# Patient Record
Sex: FEMALE | Race: WHITE | NOT HISPANIC OR LATINO | Employment: OTHER | URBAN - METROPOLITAN AREA
[De-identification: names, ages, dates, MRNs, and addresses within clinical notes are randomized per-mention and may not be internally consistent; named-entity substitution may affect disease eponyms.]

---

## 2017-08-23 ENCOUNTER — HOSPITAL ENCOUNTER (OUTPATIENT)
Dept: RADIOLOGY | Facility: MEDICAL CENTER | Age: 57
End: 2017-08-23

## 2017-08-26 ENCOUNTER — HOSPITAL ENCOUNTER (OUTPATIENT)
Dept: RADIOLOGY | Facility: MEDICAL CENTER | Age: 57
End: 2017-08-26
Attending: FAMILY MEDICINE
Payer: COMMERCIAL

## 2017-08-26 DIAGNOSIS — Z12.31 VISIT FOR SCREENING MAMMOGRAM: ICD-10-CM

## 2017-08-26 PROCEDURE — G0202 SCR MAMMO BI INCL CAD: HCPCS

## 2019-04-26 ENCOUNTER — APPOINTMENT (OUTPATIENT)
Dept: ADMISSIONS | Facility: MEDICAL CENTER | Age: 59
End: 2019-04-26
Attending: SURGERY
Payer: COMMERCIAL

## 2019-04-26 RX ORDER — ATORVASTATIN CALCIUM 40 MG/1
40 TABLET, FILM COATED ORAL DAILY
COMMUNITY

## 2019-04-26 RX ORDER — ERGOCALCIFEROL 1.25 MG/1
50000 CAPSULE ORAL
COMMUNITY

## 2019-04-26 RX ORDER — OMEPRAZOLE 20 MG/1
20 CAPSULE, DELAYED RELEASE ORAL DAILY
COMMUNITY

## 2019-04-26 RX ORDER — CITALOPRAM 20 MG/1
20 TABLET ORAL DAILY
COMMUNITY

## 2019-05-01 ENCOUNTER — HOSPITAL ENCOUNTER (OUTPATIENT)
Facility: MEDICAL CENTER | Age: 59
End: 2019-05-01
Attending: SURGERY | Admitting: SURGERY
Payer: COMMERCIAL

## 2019-05-01 ENCOUNTER — ANESTHESIA (OUTPATIENT)
Dept: SURGERY | Facility: MEDICAL CENTER | Age: 59
End: 2019-05-01
Payer: COMMERCIAL

## 2019-05-01 ENCOUNTER — ANESTHESIA EVENT (OUTPATIENT)
Dept: SURGERY | Facility: MEDICAL CENTER | Age: 59
End: 2019-05-01
Payer: COMMERCIAL

## 2019-05-01 VITALS
HEIGHT: 62 IN | TEMPERATURE: 97.9 F | WEIGHT: 171.52 LBS | BODY MASS INDEX: 31.56 KG/M2 | OXYGEN SATURATION: 91 % | HEART RATE: 80 BPM | RESPIRATION RATE: 18 BRPM | SYSTOLIC BLOOD PRESSURE: 116 MMHG | DIASTOLIC BLOOD PRESSURE: 51 MMHG

## 2019-05-01 DIAGNOSIS — G56.01 RIGHT CARPAL TUNNEL SYNDROME: Primary | ICD-10-CM

## 2019-05-01 PROCEDURE — 160002 HCHG RECOVERY MINUTES (STAT): Performed by: SURGERY

## 2019-05-01 PROCEDURE — 160046 HCHG PACU - 1ST 60 MINS PHASE II: Performed by: SURGERY

## 2019-05-01 PROCEDURE — 160036 HCHG PACU - EA ADDL 30 MINS PHASE I: Performed by: SURGERY

## 2019-05-01 PROCEDURE — 700105 HCHG RX REV CODE 258: Performed by: ANESTHESIOLOGY

## 2019-05-01 PROCEDURE — 160035 HCHG PACU - 1ST 60 MINS PHASE I: Performed by: SURGERY

## 2019-05-01 PROCEDURE — 700101 HCHG RX REV CODE 250: Performed by: ANESTHESIOLOGY

## 2019-05-01 PROCEDURE — 500431 HCHG DRESSING, KLING 2: Performed by: SURGERY

## 2019-05-01 PROCEDURE — 160048 HCHG OR STATISTICAL LEVEL 1-5: Performed by: SURGERY

## 2019-05-01 PROCEDURE — 160025 RECOVERY II MINUTES (STATS): Performed by: SURGERY

## 2019-05-01 PROCEDURE — A9270 NON-COVERED ITEM OR SERVICE: HCPCS | Performed by: ANESTHESIOLOGY

## 2019-05-01 PROCEDURE — 160009 HCHG ANES TIME/MIN: Performed by: SURGERY

## 2019-05-01 PROCEDURE — 700111 HCHG RX REV CODE 636 W/ 250 OVERRIDE (IP): Performed by: ANESTHESIOLOGY

## 2019-05-01 PROCEDURE — 700101 HCHG RX REV CODE 250

## 2019-05-01 PROCEDURE — 160029 HCHG SURGERY MINUTES - 1ST 30 MINS LEVEL 4: Performed by: SURGERY

## 2019-05-01 PROCEDURE — 700102 HCHG RX REV CODE 250 W/ 637 OVERRIDE(OP): Performed by: ANESTHESIOLOGY

## 2019-05-01 PROCEDURE — 500881 HCHG PACK, EXTREMITY: Performed by: SURGERY

## 2019-05-01 PROCEDURE — 501838 HCHG SUTURE GENERAL: Performed by: SURGERY

## 2019-05-01 RX ORDER — BUPIVACAINE HYDROCHLORIDE AND EPINEPHRINE 5; 5 MG/ML; UG/ML
INJECTION, SOLUTION EPIDURAL; INTRACAUDAL; PERINEURAL
Status: DISCONTINUED | OUTPATIENT
Start: 2019-05-01 | End: 2019-05-01 | Stop reason: HOSPADM

## 2019-05-01 RX ORDER — DEXAMETHASONE SODIUM PHOSPHATE 4 MG/ML
INJECTION, SOLUTION INTRA-ARTICULAR; INTRALESIONAL; INTRAMUSCULAR; INTRAVENOUS; SOFT TISSUE PRN
Status: DISCONTINUED | OUTPATIENT
Start: 2019-05-01 | End: 2019-05-01 | Stop reason: SURG

## 2019-05-01 RX ORDER — SODIUM CHLORIDE, SODIUM LACTATE, POTASSIUM CHLORIDE, CALCIUM CHLORIDE 600; 310; 30; 20 MG/100ML; MG/100ML; MG/100ML; MG/100ML
INJECTION, SOLUTION INTRAVENOUS CONTINUOUS
Status: DISCONTINUED | OUTPATIENT
Start: 2019-05-01 | End: 2019-05-01 | Stop reason: HOSPADM

## 2019-05-01 RX ORDER — OXYCODONE HCL 5 MG/5 ML
5 SOLUTION, ORAL ORAL
Status: DISCONTINUED | OUTPATIENT
Start: 2019-05-01 | End: 2019-05-01 | Stop reason: HOSPADM

## 2019-05-01 RX ORDER — HALOPERIDOL 5 MG/ML
1 INJECTION INTRAMUSCULAR
Status: DISCONTINUED | OUTPATIENT
Start: 2019-05-01 | End: 2019-05-01 | Stop reason: HOSPADM

## 2019-05-01 RX ORDER — OXYCODONE HCL 5 MG/5 ML
10 SOLUTION, ORAL ORAL
Status: DISCONTINUED | OUTPATIENT
Start: 2019-05-01 | End: 2019-05-01 | Stop reason: HOSPADM

## 2019-05-01 RX ORDER — CEFAZOLIN SODIUM 1 G/3ML
INJECTION, POWDER, FOR SOLUTION INTRAMUSCULAR; INTRAVENOUS PRN
Status: DISCONTINUED | OUTPATIENT
Start: 2019-05-01 | End: 2019-05-01 | Stop reason: SURG

## 2019-05-01 RX ORDER — CELECOXIB 200 MG/1
200 CAPSULE ORAL ONCE
Status: COMPLETED | OUTPATIENT
Start: 2019-05-01 | End: 2019-05-01

## 2019-05-01 RX ORDER — ACETAMINOPHEN 500 MG
1000 TABLET ORAL ONCE
Status: COMPLETED | OUTPATIENT
Start: 2019-05-01 | End: 2019-05-01

## 2019-05-01 RX ORDER — ONDANSETRON 2 MG/ML
4 INJECTION INTRAMUSCULAR; INTRAVENOUS
Status: DISCONTINUED | OUTPATIENT
Start: 2019-05-01 | End: 2019-05-01 | Stop reason: HOSPADM

## 2019-05-01 RX ORDER — ONDANSETRON 2 MG/ML
INJECTION INTRAMUSCULAR; INTRAVENOUS PRN
Status: DISCONTINUED | OUTPATIENT
Start: 2019-05-01 | End: 2019-05-01 | Stop reason: SURG

## 2019-05-01 RX ORDER — MIDAZOLAM HYDROCHLORIDE 1 MG/ML
INJECTION INTRAMUSCULAR; INTRAVENOUS
Status: COMPLETED
Start: 2019-05-01 | End: 2019-05-01

## 2019-05-01 RX ADMIN — LIDOCAINE HYDROCHLORIDE 20 MG: 20 INJECTION, SOLUTION INFILTRATION; PERINEURAL at 12:32

## 2019-05-01 RX ADMIN — PROPOFOL 300 MCG/KG/MIN: 10 INJECTION, EMULSION INTRAVENOUS at 12:32

## 2019-05-01 RX ADMIN — FENTANYL CITRATE 50 MCG: 50 INJECTION, SOLUTION INTRAMUSCULAR; INTRAVENOUS at 12:32

## 2019-05-01 RX ADMIN — ONDANSETRON 4 MG: 2 INJECTION INTRAMUSCULAR; INTRAVENOUS at 12:45

## 2019-05-01 RX ADMIN — SODIUM CHLORIDE, POTASSIUM CHLORIDE, SODIUM LACTATE AND CALCIUM CHLORIDE: 600; 310; 30; 20 INJECTION, SOLUTION INTRAVENOUS at 11:33

## 2019-05-01 RX ADMIN — FENTANYL CITRATE 50 MCG: 50 INJECTION, SOLUTION INTRAMUSCULAR; INTRAVENOUS at 12:35

## 2019-05-01 RX ADMIN — MIDAZOLAM HYDROCHLORIDE 1 MG: 1 INJECTION, SOLUTION INTRAMUSCULAR; INTRAVENOUS at 12:22

## 2019-05-01 RX ADMIN — CEFAZOLIN 2 G: 330 INJECTION, POWDER, FOR SOLUTION INTRAMUSCULAR; INTRAVENOUS at 12:32

## 2019-05-01 RX ADMIN — DEXAMETHASONE SODIUM PHOSPHATE 8 MG: 4 INJECTION, SOLUTION INTRA-ARTICULAR; INTRALESIONAL; INTRAMUSCULAR; INTRAVENOUS; SOFT TISSUE at 12:38

## 2019-05-01 RX ADMIN — PROPOFOL 60 MG: 10 INJECTION, EMULSION INTRAVENOUS at 12:32

## 2019-05-01 RX ADMIN — SODIUM CHLORIDE, POTASSIUM CHLORIDE, SODIUM LACTATE AND CALCIUM CHLORIDE: 600; 310; 30; 20 INJECTION, SOLUTION INTRAVENOUS at 12:25

## 2019-05-01 RX ADMIN — PROPOFOL 50 MG: 10 INJECTION, EMULSION INTRAVENOUS at 12:37

## 2019-05-01 RX ADMIN — CELECOXIB 200 MG: 200 CAPSULE ORAL at 11:37

## 2019-05-01 RX ADMIN — ACETAMINOPHEN 1000 MG: 500 TABLET ORAL at 11:36

## 2019-05-01 ASSESSMENT — PAIN SCALES - GENERAL: PAIN_LEVEL: 0

## 2019-05-01 NOTE — ANESTHESIA POSTPROCEDURE EVALUATION
Patient: Rachel Reich    Procedure Summary     Date:  05/01/19 Room / Location:  [facility] ROOM 21 / SURGERY SAME DAY [facility]    Anesthesia Start:  1225 Anesthesia Stop:  1258    Procedure:  RELEASE, CARPAL TUNNEL, ENDOSCOPIC (Right Hand) Diagnosis:  (HAND PAIN, CARPAL TUNNEL SYNDROME RIGHT WRIST)    Surgeon:  Winston Alvarez M.D. Responsible Provider:  Ant Larkin M.D.    Anesthesia Type:  general ASA Status:  2          Final Anesthesia Type: general  Last vitals  BP   Blood Pressure: 154/79    Temp   36.8 °C (98.2 °F)    Pulse   Pulse: 70   Resp   16    SpO2   91 %      Anesthesia Post Evaluation    Patient location during evaluation: PACU  Patient participation: complete - patient participated  Level of consciousness: awake and alert  Pain score: 0    Airway patency: patent  Anesthetic complications: no  Cardiovascular status: hemodynamically stable  Respiratory status: acceptable  Hydration status: euvolemic    PONV: none           Nurse Pain Score: 3 (NPRS)

## 2019-05-01 NOTE — ANESTHESIA PREPROCEDURE EVALUATION
Relevant Problems   No relevant active problems       Physical Exam    Airway   Mallampati: III  TM distance: >3 FB  Neck ROM: full       Cardiovascular - normal exam  Rhythm: regular  Rate: normal  (-) murmur     Dental - normal exam         Pulmonary - normal exam  Breath sounds clear to auscultation     Abdominal    Neurological - normal exam               Anesthesia Plan    ASA 2       Plan - general       Airway plan will be LMA        Induction: intravenous    Postoperative Plan: Postoperative administration of opioids is intended.    Pertinent diagnostic labs and testing reviewed    Informed Consent:    Anesthetic plan and risks discussed with patient.

## 2019-05-01 NOTE — OP REPORT
SURGEON:  Winston Alvarez MD.     ASSISTANT:  None     PREOPERATIVE DIAGNOSIS:  Right carpal tunnel syndrome.     POSTOPERATIVE DIAGNOSIS:  Right carpal tunnel syndrome.     PROCEDURE:  Right endoscopic carpal tunnel release.     ANESTHESIOLOGIST:  Ant Larkin MD.     ANESTHESIA:  General.     COMPLICATIONS:  None noted.     DRAINS:  None.     SPECIMENS:  None.     ESTIMATED BLOOD LOSS:  Minimal.     INDICATIONS:  This patient is well known to me through my   outpatient clinic for the above-mentioned diagnosis.  The patient believes and I would agree they have failed conservative treatment and are a candidate for the   above-mentioned procedure.  Prior to the procedure, the patient understood the risks,   benefits and alternatives to surgery.  The patient understood the risks to include,   but not limited to the risk of infection requiring repeat surgery, bleeding   requiring blood transfusion, nerve, blood vessel, tendon injury requiring   repair, chronic pain, chronic regional pain syndrome, failure to alleviate or   worsening of the symptoms requiring revision surgery, DVT, pulmonary embolism,  heart attack, stroke, and even death. The patient states despite these risks,   they wished to proceed with surgery.     DESCRIPTION OF PROCEDURE: The patient was met in the preoperative holding area.  The right hand was initialed as correct operative site. The patient had an   opportunity to ask questions, all questions were answered and informed consent  was obtained. The patient was brought to the operating room and laid supine on   the operating table.  All bony and dependent prominences were well padded.    General anesthesia was induced without noted complication.  Ancef was   administered for infection prophylaxis. The right upper extremity was prepped and  draped in the usual sterile fashion.  A formal time-out was performed, in   which all parties agreed upon the correct patient, procedure, and operative    site.  I began the procedure by using Esmarch to exsanguinate the extremity   and inflate the tourniquet to 250 mmHg. I then made an approximately 1.5 cm   transverse incision just proximal to distal wrist crease over palmaris longus,  which I retracted radially.  I made a distally based flap in the antebrachial   fascia.  I then introduced the small and then a large carpal canal dilators   and then introduced the synovial elevator, palpated the undersurface of the   transverse carpal ligament and the hook of hamate to confirm appropriate   location of the carpal canal. I then elevated the tenosynovium off the undersurface   of the transverse carpal ligament and then introduced the microaire single   portal device and released the transverse carpal ligament longitudinally in   its entirety under direct visualization as well as the distal forearm   antebrachial fascia with tenotomy scissors. I then deflated the tourniquet,   copiously irrigated the wound with normal saline, closed the incision with   interrupted 4-0 nylon suture, covered with sterile Xeroform, 4x4s, and a soft   dressing.  The patient awoke from anesthesia without noted complication and was   transferred in stable condition to PACU where she had no immediate post-operative  complaints.     POSTOPERATIVE PLAN:  The patient will be discharged to home per same day criteria,  sedentary use of the upper extremity and follow up in clinic in 10-14 days   for suture removal and wound check.

## 2019-05-01 NOTE — ANESTHESIA QCDR
2019 Qualified Clinical Data Registry (for Quality Improvement)     Postoperative nausea/vomiting risk protocol (Adult = 18 yrs and Pediatric 3-17 yrs)- (430 and 463)  General inhalation anesthetic (NOT TIVA) with PONV risk factors: No  Provision of anti-emetic therapy with at least 2 different classes of agents: N/A  Patient DID NOT receive anti-emetic therapy and reason is documented in Medical Record: N/A    Multimodal Pain Management- (AQI59)  Patient undergoing Elective Surgery (i.e. Outpatient, or ASC, or Prescheduled Surgery prior to Hospital Admission): Yes  Use of Multimodal Pain Management, two or more drugs and/or interventions, NOT including systemic opioids:    Exception: Documented allergy to multiple classes of analgesics:      PACU assessment of acute postoperative pain prior to Anesthesia Care End- Applies to Patients Age = 18- (ABG7)  Initial PACU pain score is which of the following: < 7/10  Patient unable to report pain score: N/A    Post-anesthetic transfer of care checklist/protocol to PACU/ICU- (426 and 427)  Upon conclusion of case, patient transferred to which of the following locations: PACU/Non-ICU  Use of transfer checklist/protocol: Yes  Exclusion: Service Performed in Patient Hospital Room (and thus did not require transfer): N/A    PACU Reintubation- (AQI31)  General anesthesia requiring endotracheal intubation (ETT) along with subsequent extubation in OR or PACU: No  Required reintubation in the PACU: N/A  Extubation was a planned trial documented in the medical record prior to removal of the original airway device: N/A    Unplanned admission to ICU related to anesthesia service up through end of PACU care- (MD51)  Unplanned admission to ICU (not initially anticipated at anesthesia start time): No

## 2019-05-01 NOTE — DISCHARGE INSTRUCTIONS
ACTIVITY: Rest and take it easy for the first 24 hours.  A responsible adult is recommended to remain with you during that time.  It is normal to feel sleepy.  We encourage you to not do anything that requires balance, judgment or coordination.    MILD FLU-LIKE SYMPTOMS ARE NORMAL. YOU MAY EXPERIENCE GENERALIZED MUSCLE ACHES, THROAT IRRITATION, HEADACHE AND/OR SOME NAUSEA.    FOR 24 HOURS DO NOT:  Drive, operate machinery or run household appliances.  Drink beer or alcoholic beverages.   Make important decisions or sign legal documents.    SPECIAL INSTRUCTIONS: *do not use both hands for two weeks **    DIET: To avoid nausea, slowly advance diet as tolerated, avoiding spicy or greasy foods for the first day.  Add more substantial food to your diet according to your physician's instructions.  Babies can be fed formula or breast milk as soon as they are hungry.  INCREASE FLUIDS AND FIBER TO AVOID CONSTIPATION.    SURGICAL DRESSING/BATHING: ***    FOLLOW-UP APPOINTMENT:  A follow-up appointment should be arranged with your doctor in ***; call to schedule. Follow up as already scheduled     You should CALL YOUR PHYSICIAN if you develop:  Fever greater than 101 degrees F.  Pain not relieved by medication, or persistent nausea or vomiting.  Excessive bleeding (blood soaking through dressing) or unexpected drainage from the wound.  Extreme redness or swelling around the incision site, drainage of pus or foul smelling drainage.  Inability to urinate or empty your bladder within 8 hours.  Problems with breathing or chest pain.    You should call 911 if you develop problems with breathing or chest pain.  If you are unable to contact your doctor or surgical center, you should go to the nearest emergency room or urgent care center.  Physician's telephone #: *[phone]**    If any questions arise, call your doctor.  If your doctor is not available, please feel free to call the Surgical Center at [phone].  The Center  is open Monday through Friday from 7AM to 7PM.  You can also call the HEALTH HOTLINE open 24 hours/day, 7 days/week and speak to a nurse at [phone], or toll free at [phone].    A registered nurse may call you a few days after your surgery to see how you are doing after your procedure.    MEDICATIONS: Resume taking daily medication.  Take prescribed pain medication with food.  If no medication is prescribed, you may take non-aspirin pain medication if needed.  PAIN MEDICATION CAN BE VERY CONSTIPATING.  Take a stool softener or laxative such as senokot, pericolace, or milk of magnesia if needed.    Prescription given for *none-has at home **.  Last pain medication given at *none **.    If your physician has prescribed pain medication that includes Acetaminophen (Tylenol), do not take additional Acetaminophen (Tylenol) while taking the prescribed medication.    Depression / Suicide Risk    As you are discharged from this [facility] facility, it is important to learn how to keep safe from harming yourself.    Recognize the warning signs:  · Abrupt changes in personality, positive or negative- including increase in energy   · Giving away possessions  · Change in eating patterns- significant weight changes-  positive or negative  · Change in sleeping patterns- unable to sleep or sleeping all the time   · Unwillingness or inability to communicate  · Depression  · Unusual sadness, discouragement and loneliness  · Talk of wanting to die  · Neglect of personal appearance   · Rebelliousness- reckless behavior  · Withdrawal from people/activities they love  · Confusion- inability to concentrate     If you or a loved one observes any of these behaviors or has concerns about self-harm, here's what you can do:  · Talk about it- your feelings and reasons for harming yourself  · Remove any means that you might use to hurt yourself (examples: pills, rope, extension cords, firearm)  · Get professional help from the  community (Mental Health, Substance Abuse, psychological counseling)  · Do not be alone:Call your Safe Contact- someone whom you trust who will be there for you.  · Call your local CRISIS HOTLINE 386-6695 or 972-337-9327  · Call your local Children's Mobile Crisis Response Team [place] (842) 915-4796 or www.Now In Store  · Call the toll free National Suicide Prevention Hotlines   · National Suicide Prevention Lifeline 098-667-GJKZ (3626)  · National Hope Line Network 800-SUICIDE (009-8438)

## 2019-05-01 NOTE — ANESTHESIA TIME REPORT
Anesthesia Start and Stop Event Times     Date Time Event    5/1/2019 1225 Anesthesia Start     1258 Anesthesia Stop        Responsible Staff  05/01/19    Name Role Begin End    Ant Larkin M.D. Anesth 1225 1258        Preop Diagnosis (Free Text):  Pre-op Diagnosis     HAND PAIN, CARPAL TUNNEL SYNDROME RIGHT WRIST        Preop Diagnosis (Codes):  Diagnosis Information     Diagnosis Code(s):         Post op Diagnosis  Carpal tunnel syndrome of right wrist      Premium Reason  Non-Premium    Comments:

## 2019-05-01 NOTE — OR NURSING
1256 to pacu sleppy but easily arousable no c/o pain at this time. r arm elevated up on pillows and ice pack to site. Site with roll gauze and tape all cdi vss report fr4om Dr Larkin and Or Rn  1315- good cms to r hand and fingers   1400- remains sleepy declines need for pain meds tolerating po fluids    1500- daughter here to take home. Meets all dc criteria reviewed all dc instructions assisted to get dressed iv removed   1517-Dc to home in wheelchair accomp with daughter with all belongings

## 2019-07-25 ENCOUNTER — APPOINTMENT (RX ONLY)
Dept: URBAN - METROPOLITAN AREA CLINIC 22 | Facility: CLINIC | Age: 59
Setting detail: DERMATOLOGY
End: 2019-07-25

## 2019-07-25 DIAGNOSIS — L82.1 OTHER SEBORRHEIC KERATOSIS: ICD-10-CM

## 2019-07-25 DIAGNOSIS — D22 MELANOCYTIC NEVI: ICD-10-CM

## 2019-07-25 DIAGNOSIS — L57.0 ACTINIC KERATOSIS: ICD-10-CM

## 2019-07-25 DIAGNOSIS — Z85.820 PERSONAL HISTORY OF MALIGNANT MELANOMA OF SKIN: ICD-10-CM

## 2019-07-25 DIAGNOSIS — L81.4 OTHER MELANIN HYPERPIGMENTATION: ICD-10-CM

## 2019-07-25 PROBLEM — F32.9 MAJOR DEPRESSIVE DISORDER, SINGLE EPISODE, UNSPECIFIED: Status: ACTIVE | Noted: 2019-07-25

## 2019-07-25 PROBLEM — D22.5 MELANOCYTIC NEVI OF TRUNK: Status: ACTIVE | Noted: 2019-07-25

## 2019-07-25 PROCEDURE — 99203 OFFICE O/P NEW LOW 30 MIN: CPT | Mod: 25

## 2019-07-25 PROCEDURE — 17003 DESTRUCT PREMALG LES 2-14: CPT

## 2019-07-25 PROCEDURE — ? COUNSELING

## 2019-07-25 PROCEDURE — 17000 DESTRUCT PREMALG LESION: CPT

## 2019-07-25 PROCEDURE — ? LIQUID NITROGEN

## 2019-07-25 ASSESSMENT — LOCATION DETAILED DESCRIPTION DERM
LOCATION DETAILED: LEFT CENTRAL EYEBROW
LOCATION DETAILED: LEFT VENTRAL PROXIMAL FOREARM
LOCATION DETAILED: INFERIOR MID FOREHEAD
LOCATION DETAILED: RIGHT PROXIMAL PRETIBIAL REGION
LOCATION DETAILED: RIGHT VENTRAL PROXIMAL FOREARM
LOCATION DETAILED: RIGHT SUPERIOR ANTERIOR NECK
LOCATION DETAILED: INFERIOR THORACIC SPINE
LOCATION DETAILED: LEFT MEDIAL ZYGOMA
LOCATION DETAILED: SUPERIOR THORACIC SPINE
LOCATION DETAILED: LEFT INFERIOR ANTERIOR NECK

## 2019-07-25 ASSESSMENT — LOCATION ZONE DERM
LOCATION ZONE: FACE
LOCATION ZONE: LEG
LOCATION ZONE: NECK
LOCATION ZONE: TRUNK
LOCATION ZONE: ARM

## 2019-07-25 ASSESSMENT — LOCATION SIMPLE DESCRIPTION DERM
LOCATION SIMPLE: RIGHT FOREARM
LOCATION SIMPLE: LEFT FOREARM
LOCATION SIMPLE: LEFT ZYGOMA
LOCATION SIMPLE: RIGHT PRETIBIAL REGION
LOCATION SIMPLE: LEFT EYEBROW
LOCATION SIMPLE: LEFT ANTERIOR NECK
LOCATION SIMPLE: INFERIOR FOREHEAD
LOCATION SIMPLE: UPPER BACK
LOCATION SIMPLE: RIGHT ANTERIOR NECK

## 2020-10-27 ENCOUNTER — APPOINTMENT (OUTPATIENT)
Dept: RADIOLOGY | Facility: MEDICAL CENTER | Age: 60
DRG: 281 | End: 2020-10-27
Attending: EMERGENCY MEDICINE
Payer: COMMERCIAL

## 2020-10-27 ENCOUNTER — HOSPITAL ENCOUNTER (INPATIENT)
Facility: MEDICAL CENTER | Age: 60
LOS: 2 days | DRG: 281 | End: 2020-10-29
Attending: EMERGENCY MEDICINE | Admitting: INTERNAL MEDICINE
Payer: COMMERCIAL

## 2020-10-27 DIAGNOSIS — I24.9 ACS (ACUTE CORONARY SYNDROME) (HCC): Primary | ICD-10-CM

## 2020-10-27 DIAGNOSIS — R79.89 ELEVATED TROPONIN: ICD-10-CM

## 2020-10-27 DIAGNOSIS — R07.9 CHEST PAIN, UNSPECIFIED TYPE: ICD-10-CM

## 2020-10-27 PROBLEM — E78.5 DYSLIPIDEMIA: Status: ACTIVE | Noted: 2020-10-27

## 2020-10-27 PROBLEM — F32.9 MAJOR DEPRESSIVE DISORDER: Status: ACTIVE | Noted: 2020-10-27

## 2020-10-27 PROBLEM — F41.9 ANXIETY: Status: ACTIVE | Noted: 2020-10-27

## 2020-10-27 LAB
ALBUMIN SERPL BCP-MCNC: 4.8 G/DL (ref 3.2–4.9)
ALBUMIN/GLOB SERPL: 1.6 G/DL
ALP SERPL-CCNC: 112 U/L (ref 30–99)
ALT SERPL-CCNC: 19 U/L (ref 2–50)
ANION GAP SERPL CALC-SCNC: 12 MMOL/L (ref 7–16)
APTT PPP: 28.4 SEC (ref 24.7–36)
AST SERPL-CCNC: 68 U/L (ref 12–45)
BASOPHILS # BLD AUTO: 0.3 % (ref 0–1.8)
BASOPHILS # BLD: 0.03 K/UL (ref 0–0.12)
BILIRUB SERPL-MCNC: 0.3 MG/DL (ref 0.1–1.5)
BUN SERPL-MCNC: 12 MG/DL (ref 8–22)
CALCIUM SERPL-MCNC: 10.2 MG/DL (ref 8.5–10.5)
CHLORIDE SERPL-SCNC: 102 MMOL/L (ref 96–112)
CO2 SERPL-SCNC: 25 MMOL/L (ref 20–33)
COVID ORDER STATUS COVID19: NORMAL
CREAT SERPL-MCNC: 0.66 MG/DL (ref 0.5–1.4)
EKG IMPRESSION: NORMAL
EKG IMPRESSION: NORMAL
EOSINOPHIL # BLD AUTO: 0.01 K/UL (ref 0–0.51)
EOSINOPHIL NFR BLD: 0.1 % (ref 0–6.9)
ERYTHROCYTE [DISTWIDTH] IN BLOOD BY AUTOMATED COUNT: 42.1 FL (ref 35.9–50)
GLOBULIN SER CALC-MCNC: 3 G/DL (ref 1.9–3.5)
GLUCOSE SERPL-MCNC: 123 MG/DL (ref 65–99)
HCT VFR BLD AUTO: 42.2 % (ref 37–47)
HGB BLD-MCNC: 13.5 G/DL (ref 12–16)
IMM GRANULOCYTES # BLD AUTO: 0.04 K/UL (ref 0–0.11)
IMM GRANULOCYTES NFR BLD AUTO: 0.4 % (ref 0–0.9)
INR PPP: 0.92 (ref 0.87–1.13)
LYMPHOCYTES # BLD AUTO: 1.21 K/UL (ref 1–4.8)
LYMPHOCYTES NFR BLD: 12.6 % (ref 22–41)
MCH RBC QN AUTO: 27.6 PG (ref 27–33)
MCHC RBC AUTO-ENTMCNC: 32 G/DL (ref 33.6–35)
MCV RBC AUTO: 86.3 FL (ref 81.4–97.8)
MONOCYTES # BLD AUTO: 0.47 K/UL (ref 0–0.85)
MONOCYTES NFR BLD AUTO: 4.9 % (ref 0–13.4)
NEUTROPHILS # BLD AUTO: 7.83 K/UL (ref 2–7.15)
NEUTROPHILS NFR BLD: 81.7 % (ref 44–72)
NRBC # BLD AUTO: 0 K/UL
NRBC BLD-RTO: 0 /100 WBC
PLATELET # BLD AUTO: 397 K/UL (ref 164–446)
PMV BLD AUTO: 10 FL (ref 9–12.9)
POTASSIUM SERPL-SCNC: 4.2 MMOL/L (ref 3.6–5.5)
PROT SERPL-MCNC: 7.8 G/DL (ref 6–8.2)
PROTHROMBIN TIME: 12.7 SEC (ref 12–14.6)
RBC # BLD AUTO: 4.89 M/UL (ref 4.2–5.4)
SODIUM SERPL-SCNC: 139 MMOL/L (ref 135–145)
TROPONIN T SERPL-MCNC: 530 NG/L (ref 6–19)
TROPONIN T SERPL-MCNC: 824 NG/L (ref 6–19)
UFH PPP CHRO-ACNC: <0.1 IU/ML
WBC # BLD AUTO: 9.6 K/UL (ref 4.8–10.8)

## 2020-10-27 PROCEDURE — 85610 PROTHROMBIN TIME: CPT

## 2020-10-27 PROCEDURE — 85025 COMPLETE CBC W/AUTO DIFF WBC: CPT

## 2020-10-27 PROCEDURE — 93005 ELECTROCARDIOGRAM TRACING: CPT | Performed by: EMERGENCY MEDICINE

## 2020-10-27 PROCEDURE — 96366 THER/PROPH/DIAG IV INF ADDON: CPT

## 2020-10-27 PROCEDURE — 700102 HCHG RX REV CODE 250 W/ 637 OVERRIDE(OP): Performed by: EMERGENCY MEDICINE

## 2020-10-27 PROCEDURE — 700111 HCHG RX REV CODE 636 W/ 250 OVERRIDE (IP): Performed by: EMERGENCY MEDICINE

## 2020-10-27 PROCEDURE — 93005 ELECTROCARDIOGRAM TRACING: CPT

## 2020-10-27 PROCEDURE — 99285 EMERGENCY DEPT VISIT HI MDM: CPT

## 2020-10-27 PROCEDURE — 80053 COMPREHEN METABOLIC PANEL: CPT

## 2020-10-27 PROCEDURE — 99223 1ST HOSP IP/OBS HIGH 75: CPT | Performed by: INTERNAL MEDICINE

## 2020-10-27 PROCEDURE — 96365 THER/PROPH/DIAG IV INF INIT: CPT

## 2020-10-27 PROCEDURE — 85520 HEPARIN ASSAY: CPT

## 2020-10-27 PROCEDURE — 71045 X-RAY EXAM CHEST 1 VIEW: CPT

## 2020-10-27 PROCEDURE — 770020 HCHG ROOM/CARE - TELE (206)

## 2020-10-27 PROCEDURE — 84484 ASSAY OF TROPONIN QUANT: CPT

## 2020-10-27 PROCEDURE — A9270 NON-COVERED ITEM OR SERVICE: HCPCS | Performed by: EMERGENCY MEDICINE

## 2020-10-27 PROCEDURE — C9803 HOPD COVID-19 SPEC COLLECT: HCPCS | Performed by: INTERNAL MEDICINE

## 2020-10-27 PROCEDURE — 85730 THROMBOPLASTIN TIME PARTIAL: CPT

## 2020-10-27 PROCEDURE — 96375 TX/PRO/DX INJ NEW DRUG ADDON: CPT

## 2020-10-27 PROCEDURE — 700105 HCHG RX REV CODE 258: Performed by: EMERGENCY MEDICINE

## 2020-10-27 PROCEDURE — U0003 INFECTIOUS AGENT DETECTION BY NUCLEIC ACID (DNA OR RNA); SEVERE ACUTE RESPIRATORY SYNDROME CORONAVIRUS 2 (SARS-COV-2) (CORONAVIRUS DISEASE [COVID-19]), AMPLIFIED PROBE TECHNIQUE, MAKING USE OF HIGH THROUGHPUT TECHNOLOGIES AS DESCRIBED BY CMS-2020-01-R: HCPCS

## 2020-10-27 RX ORDER — POLYETHYLENE GLYCOL 3350 17 G/17G
1 POWDER, FOR SOLUTION ORAL
Status: DISCONTINUED | OUTPATIENT
Start: 2020-10-27 | End: 2020-10-29 | Stop reason: HOSPADM

## 2020-10-27 RX ORDER — SODIUM CHLORIDE 9 MG/ML
250 INJECTION, SOLUTION INTRAVENOUS ONCE
Status: COMPLETED | OUTPATIENT
Start: 2020-10-27 | End: 2020-10-28

## 2020-10-27 RX ORDER — ONDANSETRON 2 MG/ML
4 INJECTION INTRAMUSCULAR; INTRAVENOUS ONCE
Status: COMPLETED | OUTPATIENT
Start: 2020-10-27 | End: 2020-10-27

## 2020-10-27 RX ORDER — HEPARIN SODIUM 1000 [USP'U]/ML
60 INJECTION, SOLUTION INTRAVENOUS; SUBCUTANEOUS ONCE
Status: COMPLETED | OUTPATIENT
Start: 2020-10-27 | End: 2020-10-27

## 2020-10-27 RX ORDER — ATORVASTATIN CALCIUM 20 MG/1
40 TABLET, FILM COATED ORAL DAILY
Status: DISCONTINUED | OUTPATIENT
Start: 2020-10-28 | End: 2020-10-27

## 2020-10-27 RX ORDER — HEPARIN SODIUM 1000 [USP'U]/ML
30 INJECTION, SOLUTION INTRAVENOUS; SUBCUTANEOUS PRN
Status: DISCONTINUED | OUTPATIENT
Start: 2020-10-27 | End: 2020-10-28

## 2020-10-27 RX ORDER — NITROGLYCERIN 0.4 MG/1
0.4 TABLET SUBLINGUAL ONCE
Status: ACTIVE | OUTPATIENT
Start: 2020-10-27 | End: 2020-10-28

## 2020-10-27 RX ORDER — BISACODYL 10 MG
10 SUPPOSITORY, RECTAL RECTAL
Status: DISCONTINUED | OUTPATIENT
Start: 2020-10-27 | End: 2020-10-29 | Stop reason: HOSPADM

## 2020-10-27 RX ORDER — CITALOPRAM 20 MG/1
20 TABLET ORAL DAILY
Status: DISCONTINUED | OUTPATIENT
Start: 2020-10-28 | End: 2020-10-29 | Stop reason: HOSPADM

## 2020-10-27 RX ORDER — ATORVASTATIN CALCIUM 80 MG/1
80 TABLET, FILM COATED ORAL DAILY
Status: DISCONTINUED | OUTPATIENT
Start: 2020-10-28 | End: 2020-10-29

## 2020-10-27 RX ORDER — HEPARIN SODIUM 5000 [USP'U]/100ML
0-30 INJECTION, SOLUTION INTRAVENOUS CONTINUOUS
Status: DISCONTINUED | OUTPATIENT
Start: 2020-10-27 | End: 2020-10-28

## 2020-10-27 RX ORDER — OMEPRAZOLE 20 MG/1
20 CAPSULE, DELAYED RELEASE ORAL DAILY
Status: DISCONTINUED | OUTPATIENT
Start: 2020-10-28 | End: 2020-10-29 | Stop reason: HOSPADM

## 2020-10-27 RX ORDER — BUPROPION HYDROCHLORIDE 150 MG/1
150 TABLET, EXTENDED RELEASE ORAL DAILY
Status: DISCONTINUED | OUTPATIENT
Start: 2020-10-28 | End: 2020-10-29 | Stop reason: HOSPADM

## 2020-10-27 RX ORDER — AMOXICILLIN 250 MG
2 CAPSULE ORAL 2 TIMES DAILY
Status: DISCONTINUED | OUTPATIENT
Start: 2020-10-27 | End: 2020-10-29 | Stop reason: HOSPADM

## 2020-10-27 RX ORDER — ASPIRIN 325 MG
325 TABLET ORAL ONCE
Status: COMPLETED | OUTPATIENT
Start: 2020-10-27 | End: 2020-10-27

## 2020-10-27 RX ORDER — SODIUM CHLORIDE, SODIUM LACTATE, POTASSIUM CHLORIDE, CALCIUM CHLORIDE 600; 310; 30; 20 MG/100ML; MG/100ML; MG/100ML; MG/100ML
INJECTION, SOLUTION INTRAVENOUS CONTINUOUS
Status: DISCONTINUED | OUTPATIENT
Start: 2020-10-27 | End: 2020-10-28

## 2020-10-27 RX ADMIN — ONDANSETRON 4 MG: 2 INJECTION INTRAMUSCULAR; INTRAVENOUS at 19:12

## 2020-10-27 RX ADMIN — SODIUM CHLORIDE 250 ML: 9 INJECTION, SOLUTION INTRAVENOUS at 22:38

## 2020-10-27 RX ADMIN — HEPARIN SODIUM 3600 UNITS: 1000 INJECTION INTRAVENOUS; SUBCUTANEOUS at 21:42

## 2020-10-27 RX ADMIN — HEPARIN SODIUM 12 UNITS/KG/HR: 5000 INJECTION, SOLUTION INTRAVENOUS at 21:43

## 2020-10-27 RX ADMIN — ASPIRIN 325 MG: 325 TABLET, FILM COATED ORAL at 19:12

## 2020-10-27 ASSESSMENT — LIFESTYLE VARIABLES
DO YOU DRINK ALCOHOL: NO
DOES PATIENT WANT TO STOP DRINKING: NO

## 2020-10-27 ASSESSMENT — ENCOUNTER SYMPTOMS
EYES NEGATIVE: 1
NEUROLOGICAL NEGATIVE: 1
MUSCULOSKELETAL NEGATIVE: 1
PSYCHIATRIC NEGATIVE: 1
CONSTITUTIONAL NEGATIVE: 1
RESPIRATORY NEGATIVE: 1
NAUSEA: 0

## 2020-10-27 NOTE — ED TRIAGE NOTES
"  Chief Complaint   Patient presents with   • Sent by MD     Pt sent by MD due to \"inconclusive\" EKG   • Nausea     Having intermittent episodes of nausea since this morning   • Chest Pain     Substernal CP that started this morning, pt describes it as a \"heavy pressure\" or a \"scared feeling\"   • Arm Pain     PT reports after CP, R arm shapr pain and numbness from R arm to R shoulder     EKG completed in triage.    R BP:  166/95, L BP: 151/94    CP labs initiated per triage protocol,    Pt is alert and oriented, speaking in full sentences, follows commands and responds appropriately to questions. NAD. Resp are even and unlabored.  Pt placed in lobby. Pt educated on triage process. Pt encouraged to alert staff for any changes.    "

## 2020-10-28 ENCOUNTER — APPOINTMENT (OUTPATIENT)
Dept: CARDIOLOGY | Facility: MEDICAL CENTER | Age: 60
DRG: 281 | End: 2020-10-28
Attending: INTERNAL MEDICINE
Payer: COMMERCIAL

## 2020-10-28 PROBLEM — I21.4 NSTEMI (NON-ST ELEVATED MYOCARDIAL INFARCTION) (HCC): Status: ACTIVE | Noted: 2020-10-27

## 2020-10-28 LAB
ALBUMIN SERPL BCP-MCNC: 4 G/DL (ref 3.2–4.9)
ALBUMIN/GLOB SERPL: 1.7 G/DL
ALP SERPL-CCNC: 89 U/L (ref 30–99)
ALT SERPL-CCNC: 21 U/L (ref 2–50)
ANION GAP SERPL CALC-SCNC: 10 MMOL/L (ref 7–16)
AST SERPL-CCNC: 80 U/L (ref 12–45)
BASOPHILS # BLD AUTO: 0.5 % (ref 0–1.8)
BASOPHILS # BLD: 0.04 K/UL (ref 0–0.12)
BILIRUB SERPL-MCNC: 0.2 MG/DL (ref 0.1–1.5)
BUN SERPL-MCNC: 12 MG/DL (ref 8–22)
CALCIUM SERPL-MCNC: 8.8 MG/DL (ref 8.5–10.5)
CHLORIDE SERPL-SCNC: 103 MMOL/L (ref 96–112)
CHOLEST SERPL-MCNC: 150 MG/DL (ref 100–199)
CO2 SERPL-SCNC: 25 MMOL/L (ref 20–33)
CREAT SERPL-MCNC: 0.67 MG/DL (ref 0.5–1.4)
EKG IMPRESSION: NORMAL
EOSINOPHIL # BLD AUTO: 0.07 K/UL (ref 0–0.51)
EOSINOPHIL NFR BLD: 0.8 % (ref 0–6.9)
ERYTHROCYTE [DISTWIDTH] IN BLOOD BY AUTOMATED COUNT: 44.3 FL (ref 35.9–50)
GLOBULIN SER CALC-MCNC: 2.4 G/DL (ref 1.9–3.5)
GLUCOSE SERPL-MCNC: 121 MG/DL (ref 65–99)
HCT VFR BLD AUTO: 37.3 % (ref 37–47)
HDLC SERPL-MCNC: 42 MG/DL
HGB BLD-MCNC: 11.9 G/DL (ref 12–16)
IMM GRANULOCYTES # BLD AUTO: 0.02 K/UL (ref 0–0.11)
IMM GRANULOCYTES NFR BLD AUTO: 0.2 % (ref 0–0.9)
LDLC SERPL CALC-MCNC: 65 MG/DL
LV EJECT FRACT  99904: 65
LV EJECT FRACT MOD 2C 99903: 63.62
LV EJECT FRACT MOD 4C 99902: 64.14
LV EJECT FRACT MOD BP 99901: 63.69
LYMPHOCYTES # BLD AUTO: 2.21 K/UL (ref 1–4.8)
LYMPHOCYTES NFR BLD: 26.1 % (ref 22–41)
MCH RBC QN AUTO: 28.3 PG (ref 27–33)
MCHC RBC AUTO-ENTMCNC: 31.9 G/DL (ref 33.6–35)
MCV RBC AUTO: 88.6 FL (ref 81.4–97.8)
MONOCYTES # BLD AUTO: 0.69 K/UL (ref 0–0.85)
MONOCYTES NFR BLD AUTO: 8.2 % (ref 0–13.4)
NEUTROPHILS # BLD AUTO: 5.43 K/UL (ref 2–7.15)
NEUTROPHILS NFR BLD: 64.2 % (ref 44–72)
NRBC # BLD AUTO: 0 K/UL
NRBC BLD-RTO: 0 /100 WBC
PLATELET # BLD AUTO: 338 K/UL (ref 164–446)
PMV BLD AUTO: 10 FL (ref 9–12.9)
POTASSIUM SERPL-SCNC: 3.5 MMOL/L (ref 3.6–5.5)
PROT SERPL-MCNC: 6.4 G/DL (ref 6–8.2)
RBC # BLD AUTO: 4.21 M/UL (ref 4.2–5.4)
SARS-COV-2 RNA RESP QL NAA+PROBE: NOTDETECTED
SODIUM SERPL-SCNC: 138 MMOL/L (ref 135–145)
SPECIMEN SOURCE: NORMAL
TRIGL SERPL-MCNC: 213 MG/DL (ref 0–149)
TROPONIN T SERPL-MCNC: 1553 NG/L (ref 6–19)
UFH PPP CHRO-ACNC: 0.22 IU/ML
UFH PPP CHRO-ACNC: <0.1 IU/ML
WBC # BLD AUTO: 8.5 K/UL (ref 4.8–10.8)

## 2020-10-28 PROCEDURE — 84484 ASSAY OF TROPONIN QUANT: CPT

## 2020-10-28 PROCEDURE — B2111ZZ FLUOROSCOPY OF MULTIPLE CORONARY ARTERIES USING LOW OSMOLAR CONTRAST: ICD-10-PCS | Performed by: INTERNAL MEDICINE

## 2020-10-28 PROCEDURE — 93306 TTE W/DOPPLER COMPLETE: CPT | Mod: 26 | Performed by: INTERNAL MEDICINE

## 2020-10-28 PROCEDURE — A9270 NON-COVERED ITEM OR SERVICE: HCPCS | Performed by: INTERNAL MEDICINE

## 2020-10-28 PROCEDURE — 93005 ELECTROCARDIOGRAM TRACING: CPT | Performed by: INTERNAL MEDICINE

## 2020-10-28 PROCEDURE — 770020 HCHG ROOM/CARE - TELE (206)

## 2020-10-28 PROCEDURE — 80053 COMPREHEN METABOLIC PANEL: CPT

## 2020-10-28 PROCEDURE — A9270 NON-COVERED ITEM OR SERVICE: HCPCS | Performed by: STUDENT IN AN ORGANIZED HEALTH CARE EDUCATION/TRAINING PROGRAM

## 2020-10-28 PROCEDURE — 700105 HCHG RX REV CODE 258: Performed by: STUDENT IN AN ORGANIZED HEALTH CARE EDUCATION/TRAINING PROGRAM

## 2020-10-28 PROCEDURE — 700105 HCHG RX REV CODE 258: Performed by: INTERNAL MEDICINE

## 2020-10-28 PROCEDURE — 700102 HCHG RX REV CODE 250 W/ 637 OVERRIDE(OP): Performed by: STUDENT IN AN ORGANIZED HEALTH CARE EDUCATION/TRAINING PROGRAM

## 2020-10-28 PROCEDURE — 96366 THER/PROPH/DIAG IV INF ADDON: CPT

## 2020-10-28 PROCEDURE — 700102 HCHG RX REV CODE 250 W/ 637 OVERRIDE(OP): Performed by: INTERNAL MEDICINE

## 2020-10-28 PROCEDURE — 85520 HEPARIN ASSAY: CPT

## 2020-10-28 PROCEDURE — 4A023N7 MEASUREMENT OF CARDIAC SAMPLING AND PRESSURE, LEFT HEART, PERCUTANEOUS APPROACH: ICD-10-PCS | Performed by: INTERNAL MEDICINE

## 2020-10-28 PROCEDURE — 99152 MOD SED SAME PHYS/QHP 5/>YRS: CPT | Performed by: INTERNAL MEDICINE

## 2020-10-28 PROCEDURE — 93458 L HRT ARTERY/VENTRICLE ANGIO: CPT | Mod: 26 | Performed by: INTERNAL MEDICINE

## 2020-10-28 PROCEDURE — 700111 HCHG RX REV CODE 636 W/ 250 OVERRIDE (IP): Performed by: EMERGENCY MEDICINE

## 2020-10-28 PROCEDURE — 700111 HCHG RX REV CODE 636 W/ 250 OVERRIDE (IP)

## 2020-10-28 PROCEDURE — 99232 SBSQ HOSP IP/OBS MODERATE 35: CPT | Performed by: INTERNAL MEDICINE

## 2020-10-28 PROCEDURE — 700111 HCHG RX REV CODE 636 W/ 250 OVERRIDE (IP): Performed by: INTERNAL MEDICINE

## 2020-10-28 PROCEDURE — 80061 LIPID PANEL: CPT

## 2020-10-28 PROCEDURE — 99231 SBSQ HOSP IP/OBS SF/LOW 25: CPT | Mod: 25 | Performed by: INTERNAL MEDICINE

## 2020-10-28 PROCEDURE — C1894 INTRO/SHEATH, NON-LASER: HCPCS

## 2020-10-28 PROCEDURE — 700117 HCHG RX CONTRAST REV CODE 255: Performed by: INTERNAL MEDICINE

## 2020-10-28 PROCEDURE — 96376 TX/PRO/DX INJ SAME DRUG ADON: CPT

## 2020-10-28 PROCEDURE — 85025 COMPLETE CBC W/AUTO DIFF WBC: CPT

## 2020-10-28 PROCEDURE — 700101 HCHG RX REV CODE 250

## 2020-10-28 PROCEDURE — 93306 TTE W/DOPPLER COMPLETE: CPT

## 2020-10-28 PROCEDURE — B2151ZZ FLUOROSCOPY OF LEFT HEART USING LOW OSMOLAR CONTRAST: ICD-10-PCS | Performed by: INTERNAL MEDICINE

## 2020-10-28 RX ORDER — HEPARIN SODIUM 200 [USP'U]/100ML
INJECTION, SOLUTION INTRAVENOUS
Status: COMPLETED
Start: 2020-10-28 | End: 2020-10-28

## 2020-10-28 RX ORDER — POTASSIUM CHLORIDE 20 MEQ/1
40 TABLET, EXTENDED RELEASE ORAL ONCE
Status: COMPLETED | OUTPATIENT
Start: 2020-10-28 | End: 2020-10-28

## 2020-10-28 RX ORDER — HEPARIN SODIUM,PORCINE 1000/ML
VIAL (ML) INJECTION
Status: DISPENSED
Start: 2020-10-28 | End: 2020-10-29

## 2020-10-28 RX ORDER — NITROGLYCERIN 20 MG/100ML
10 INJECTION INTRAVENOUS CONTINUOUS
Status: DISCONTINUED | OUTPATIENT
Start: 2020-10-28 | End: 2020-10-28

## 2020-10-28 RX ORDER — ACETAMINOPHEN 325 MG/1
325 TABLET ORAL EVERY 4 HOURS PRN
Status: DISCONTINUED | OUTPATIENT
Start: 2020-10-28 | End: 2020-10-29 | Stop reason: HOSPADM

## 2020-10-28 RX ORDER — ONDANSETRON 2 MG/ML
INJECTION INTRAMUSCULAR; INTRAVENOUS
Status: DISPENSED
Start: 2020-10-28 | End: 2020-10-29

## 2020-10-28 RX ORDER — MIDAZOLAM HYDROCHLORIDE 1 MG/ML
INJECTION INTRAMUSCULAR; INTRAVENOUS
Status: COMPLETED
Start: 2020-10-28 | End: 2020-10-28

## 2020-10-28 RX ORDER — VERAPAMIL HYDROCHLORIDE 2.5 MG/ML
INJECTION, SOLUTION INTRAVENOUS
Status: COMPLETED
Start: 2020-10-28 | End: 2020-10-28

## 2020-10-28 RX ORDER — SODIUM CHLORIDE 9 MG/ML
INJECTION, SOLUTION INTRAVENOUS CONTINUOUS
Status: DISCONTINUED | OUTPATIENT
Start: 2020-10-28 | End: 2020-10-29 | Stop reason: HOSPADM

## 2020-10-28 RX ORDER — LIDOCAINE HYDROCHLORIDE 20 MG/ML
INJECTION, SOLUTION INFILTRATION; PERINEURAL
Status: COMPLETED
Start: 2020-10-28 | End: 2020-10-28

## 2020-10-28 RX ADMIN — HEPARIN SODIUM 2000 UNITS: 200 INJECTION, SOLUTION INTRAVENOUS at 15:28

## 2020-10-28 RX ADMIN — NITROGLYCERIN 10 MCG/MIN: 20 INJECTION INTRAVENOUS at 09:12

## 2020-10-28 RX ADMIN — MIDAZOLAM HYDROCHLORIDE 2 MG: 1 INJECTION, SOLUTION INTRAMUSCULAR; INTRAVENOUS at 15:31

## 2020-10-28 RX ADMIN — SODIUM CHLORIDE: 9 INJECTION, SOLUTION INTRAVENOUS at 09:05

## 2020-10-28 RX ADMIN — VERAPAMIL HYDROCHLORIDE 5 MG: 2.5 INJECTION, SOLUTION INTRAVENOUS at 15:12

## 2020-10-28 RX ADMIN — HEPARIN SODIUM 1800 UNITS: 1000 INJECTION, SOLUTION INTRAVENOUS; SUBCUTANEOUS at 06:25

## 2020-10-28 RX ADMIN — ACETAMINOPHEN 325 MG: 325 TABLET, FILM COATED ORAL at 16:33

## 2020-10-28 RX ADMIN — POTASSIUM CHLORIDE 40 MEQ: 20 TABLET, EXTENDED RELEASE ORAL at 09:15

## 2020-10-28 RX ADMIN — SODIUM CHLORIDE, POTASSIUM CHLORIDE, SODIUM LACTATE AND CALCIUM CHLORIDE: 600; 310; 30; 20 INJECTION, SOLUTION INTRAVENOUS at 00:03

## 2020-10-28 RX ADMIN — METOPROLOL TARTRATE 25 MG: 25 TABLET, FILM COATED ORAL at 00:03

## 2020-10-28 RX ADMIN — LIDOCAINE HYDROCHLORIDE: 20 INJECTION, SOLUTION INFILTRATION; PERINEURAL at 15:12

## 2020-10-28 RX ADMIN — NITROGLYCERIN 10 ML: 20 INJECTION INTRAVENOUS at 15:12

## 2020-10-28 RX ADMIN — CITALOPRAM HYDROBROMIDE 20 MG: 20 TABLET ORAL at 06:17

## 2020-10-28 RX ADMIN — HEPARIN SODIUM 1800 UNITS: 1000 INJECTION, SOLUTION INTRAVENOUS; SUBCUTANEOUS at 13:12

## 2020-10-28 RX ADMIN — METOPROLOL TARTRATE 25 MG: 25 TABLET, FILM COATED ORAL at 21:21

## 2020-10-28 RX ADMIN — ATORVASTATIN CALCIUM 80 MG: 80 TABLET, FILM COATED ORAL at 06:17

## 2020-10-28 RX ADMIN — METOPROLOL TARTRATE 25 MG: 25 TABLET, FILM COATED ORAL at 06:19

## 2020-10-28 RX ADMIN — ACETAMINOPHEN 325 MG: 325 TABLET, FILM COATED ORAL at 09:33

## 2020-10-28 RX ADMIN — HUMAN ALBUMIN MICROSPHERES AND PERFLUTREN 3 ML: 10; .22 INJECTION, SOLUTION INTRAVENOUS at 13:50

## 2020-10-28 RX ADMIN — BUPROPION HYDROCHLORIDE 150 MG: 150 TABLET, EXTENDED RELEASE ORAL at 06:18

## 2020-10-28 RX ADMIN — ASPIRIN 81 MG: 81 TABLET, COATED ORAL at 06:18

## 2020-10-28 RX ADMIN — FENTANYL CITRATE 100 MCG: 50 INJECTION, SOLUTION INTRAMUSCULAR; INTRAVENOUS at 15:31

## 2020-10-28 RX ADMIN — OMEPRAZOLE 20 MG: 20 CAPSULE, DELAYED RELEASE ORAL at 06:18

## 2020-10-28 ASSESSMENT — LIFESTYLE VARIABLES
HAVE YOU EVER FELT YOU SHOULD CUT DOWN ON YOUR DRINKING: NO
CONSUMPTION TOTAL: NEGATIVE
HOW MANY TIMES IN THE PAST YEAR HAVE YOU HAD 5 OR MORE DRINKS IN A DAY: 0
TOTAL SCORE: 0
ALCOHOL_USE: NO
EVER HAD A DRINK FIRST THING IN THE MORNING TO STEADY YOUR NERVES TO GET RID OF A HANGOVER: NO
TOTAL SCORE: 0
HAVE PEOPLE ANNOYED YOU BY CRITICIZING YOUR DRINKING: NO
TOTAL SCORE: 0
ON A TYPICAL DAY WHEN YOU DRINK ALCOHOL HOW MANY DRINKS DO YOU HAVE: 0
AVERAGE NUMBER OF DAYS PER WEEK YOU HAVE A DRINK CONTAINING ALCOHOL: 0
EVER FELT BAD OR GUILTY ABOUT YOUR DRINKING: NO

## 2020-10-28 ASSESSMENT — ENCOUNTER SYMPTOMS
CHEST TIGHTNESS: 0
SHORTNESS OF BREATH: 0
EYE DISCHARGE: 0
SPUTUM PRODUCTION: 0
CLAUDICATION: 0
HEADACHES: 0
HEMOPTYSIS: 0
BACK PAIN: 0
FLANK PAIN: 0
SPEECH CHANGE: 0
WEAKNESS: 0
BLURRED VISION: 0
ABDOMINAL DISTENTION: 0
TROUBLE SWALLOWING: 0
VOMITING: 0
CONFUSION: 0
BLOOD IN STOOL: 0
DOUBLE VISION: 0
DEPRESSION: 0
AGITATION: 0
SENSORY CHANGE: 0
MYALGIAS: 0
ABDOMINAL PAIN: 0
NUMBNESS: 0
PALPITATIONS: 0
PHOTOPHOBIA: 0
COUGH: 0
BRUISES/BLEEDS EASILY: 0
NERVOUS/ANXIOUS: 0
HEARTBURN: 0
COLOR CHANGE: 0
FEVER: 0
ORTHOPNEA: 0
DIARRHEA: 0
DIZZINESS: 0
DIAPHORESIS: 0
CHILLS: 0
WHEEZING: 0
NAUSEA: 0
SORE THROAT: 0

## 2020-10-28 ASSESSMENT — COGNITIVE AND FUNCTIONAL STATUS - GENERAL
DAILY ACTIVITIY SCORE: 24
SUGGESTED CMS G CODE MODIFIER DAILY ACTIVITY: CH
MOBILITY SCORE: 24
SUGGESTED CMS G CODE MODIFIER MOBILITY: CH

## 2020-10-28 ASSESSMENT — PATIENT HEALTH QUESTIONNAIRE - PHQ9
2. FEELING DOWN, DEPRESSED, IRRITABLE, OR HOPELESS: NOT AT ALL
1. LITTLE INTEREST OR PLEASURE IN DOING THINGS: NOT AT ALL
SUM OF ALL RESPONSES TO PHQ9 QUESTIONS 1 AND 2: 0

## 2020-10-28 ASSESSMENT — FIBROSIS 4 INDEX: FIB4 SCORE: 3.15

## 2020-10-28 NOTE — ASSESSMENT & PLAN NOTE
Continue Atorvastatin. Patient does not remember when she last had her lipids checked. Check Lipid profile in AM.

## 2020-10-28 NOTE — ED NOTES
Med rec updated and complete. Allergies reviewed. Pt denies antibiotic use in lat 14 days.      Home pharmacy [facility]

## 2020-10-28 NOTE — ASSESSMENT & PLAN NOTE
The patient initially presented with progressive, exertional chest pain.  Initial EKG in the ED does show T wave flattening however no overt ST segment elevations in the setting of elevated troponins.  These findings are consistent with NSTEMI  -Cardiology is been informed and she is currently undergoing left heart catheterization this afternoon  -Continue aspirin, statin, beta-blocker  -Initiate lisinopril after procedure

## 2020-10-28 NOTE — PROGRESS NOTES
Report received from MEHNAZ Yin. Patient resting comfortably in bed. Patient has heparin drip running and LR, settings per order. Will continue to monitor.

## 2020-10-28 NOTE — PROGRESS NOTES
Attending Hospitalist today is Dr Lorenzo starting at 0700. Please call this Physician for orders, updates and questions.

## 2020-10-28 NOTE — PROGRESS NOTES
Cardiology Follow Up Progress Note    Date of Service  10/28/2020    Attending Physician  Moses Lorenzo M.D.    Chief Complaint   Chest pain     HPI  Rachel Reich is a 60 y.o. female admitted 10/27/2020 with chest pain. Sent from urgent care. ekg suggest septal infarct and trop trending up 824.     History of dsylipidemia     Interim Events  Patient resting in bed, mild chest discomfort. Trop trending up 824. Denies any sob, dizziness or palpitations.     NSR on the monitor.   K 3.5    Review of Systems  Review of Systems   Constitutional: Negative for chills, diaphoresis and fever.   HENT: Negative for nosebleeds and trouble swallowing.    Respiratory: Negative for cough, chest tightness and shortness of breath.    Cardiovascular: Positive for chest pain. Negative for palpitations and leg swelling.   Gastrointestinal: Negative for abdominal distention, abdominal pain and blood in stool.   Genitourinary: Negative for hematuria.   Skin: Negative for color change.   Neurological: Negative for dizziness, syncope and numbness.   Psychiatric/Behavioral: Negative for agitation and confusion. The patient is not nervous/anxious.        Vital signs in last 24 hours  Temp:  [36.3 °C (97.3 °F)-37.2 °C (98.9 °F)] 37.2 °C (98.9 °F)  Pulse:  [60-86] 65  Resp:  [16-20] 18  BP: (131-166)/() 148/72  SpO2:  [90 %-96 %] 93 %    Physical Exam  Physical Exam  Vitals signs and nursing note reviewed.   Constitutional:       Appearance: Normal appearance.   HENT:      Head: Normocephalic and atraumatic.   Eyes:      Pupils: Pupils are equal, round, and reactive to light.   Neck:      Musculoskeletal: Normal range of motion.   Cardiovascular:      Rate and Rhythm: Normal rate and regular rhythm.      Heart sounds: Normal heart sounds. No murmur.   Pulmonary:      Effort: Pulmonary effort is normal.      Breath sounds: Normal breath sounds.   Abdominal:      General: Abdomen is flat.   Skin:     General: Skin is warm and dry.    Neurological:      General: No focal deficit present.      Mental Status: She is alert and oriented to person, place, and time.   Psychiatric:         Mood and Affect: Mood normal.         Behavior: Behavior normal.         Thought Content: Thought content normal.         Judgment: Judgment normal.         Lab Review  Lab Results   Component Value Date/Time    WBC 8.5 10/28/2020 03:56 AM    RBC 4.21 10/28/2020 03:56 AM    HEMOGLOBIN 11.9 (L) 10/28/2020 03:56 AM    HEMATOCRIT 37.3 10/28/2020 03:56 AM    MCV 88.6 10/28/2020 03:56 AM    MCH 28.3 10/28/2020 03:56 AM    MCHC 31.9 (L) 10/28/2020 03:56 AM    MPV 10.0 10/28/2020 03:56 AM      Lab Results   Component Value Date/Time    SODIUM 138 10/28/2020 03:56 AM    POTASSIUM 3.5 (L) 10/28/2020 03:56 AM    CHLORIDE 103 10/28/2020 03:56 AM    CO2 25 10/28/2020 03:56 AM    GLUCOSE 121 (H) 10/28/2020 03:56 AM    BUN 12 10/28/2020 03:56 AM    CREATININE 0.67 10/28/2020 03:56 AM      Lab Results   Component Value Date/Time    ASTSGOT 80 (H) 10/28/2020 03:56 AM    ALTSGPT 21 10/28/2020 03:56 AM     Lab Results   Component Value Date/Time    CHOLSTRLTOT 150 10/28/2020 03:56 AM    LDL 65 10/28/2020 03:56 AM    HDL 42 10/28/2020 03:56 AM    TRIGLYCERIDE 213 (H) 10/28/2020 03:56 AM    TROPONINT 824 (H) 10/27/2020 10:40 PM       No results for input(s): NTPROBNP in the last 72 hours.    Cardiac Imaging and Procedures Review  EKG:    SINUS RHYTHM   NONSPECIFIC T ABNORMALITIES, LATERAL LEADS   Compared to ECG 10/27/2020 18:44:12   T-wave abnormality now present   Myocardial infarct finding no longer present     Echocardiogram:  Pending     Cardiac Catheterization:  Pending     Assessment/Plan  No new Assessment & Plan notes have been filed under this hospital service since the last note was generated.  Service: Cardiology    1. NSTEMI:  - add nitroglycerin gtt   - continue heparin gtt, plan for coronary angiogram today   - continue asa 81mg qd, atorvastatin 80mg qd  - increased  metoprolol 25mg TID   - ECHO is pending        2. Hyperlipidemia:  - continue statin therapy     3. Hypertension:  - increase bb, hold off on ace/arb at this time for blood pressure room for nitroglycerin gtt      Thank you for allowing me to participate in the care of this patient.  I will continue to follow this patient    Please contact me with any questions.    STACI Sotomayor   [facility]

## 2020-10-28 NOTE — PROGRESS NOTES
Patient feeling warm. Temperature taken: 98.8F. Cold pack received. No other complaints or distress noted. Will continue to monitor.

## 2020-10-28 NOTE — ED PROVIDER NOTES
"ED Provider Note    CHIEF COMPLAINT  Chief Complaint   Patient presents with   • Sent by MD     Pt sent by MD due to \"inconclusive\" EKG   • Nausea     Having intermittent episodes of nausea since this morning   • Chest Pain     Substernal CP that started this morning, pt describes it as a \"heavy pressure\" or a \"scared feeling\"   • Arm Pain     PT reports after CP, R arm shapr pain and numbness from R arm to R shoulder       HPI  Rachel Reich is a 60 y.o. female with history of dyslipidemia who presents complaining of chest pain, nausea.    Patient states she woke up at 630 to her alarm and noticed that she was having chest heaviness.  She has been having right arm pain and numbness for the last 3 weeks that she has noticed only upon awakening and attributed this to a neck or back issue.  Today, however, she also noticed this chest pressure/heaviness.  It was fairly constant throughout the day but intermittently accompanied by diaphoresis.  This would typically happen when she was walking or doing something more exertional.      She admits to nausea but denies vomiting, shortness of breath, tearing pain in her back, cough, fever, chills, leg swelling, calf pain, and history of similar symptoms.      ALLERGIES  No Known Allergies    CURRENT MEDICATIONS  Home Medications     Reviewed by Sylvia Treviño on 10/27/20 at 1850  Med List Status: Complete   Medication Last Dose Status   atorvastatin (LIPITOR) 40 MG Tab 10/27/2020 Active   buPROPion SR (WELLBUTRIN-SR) 150 MG TB12 10/27/2020 Active   citalopram (CELEXA) 20 MG Tab 10/27/2020 Active   omeprazole (PRILOSEC) 20 MG delayed-release capsule 10/27/2020 Active   vitamin D, Ergocalciferol, (DRISDOL) 77299 units Cap capsule 10/26/2020 Active                PAST MEDICAL HISTORY   has a past medical history of Allergy, Anesthesia, Anxiety, Arthritis, Cataract, Depression, Depression, GERD (gastroesophageal reflux disease), High cholesterol, and Melanoma " "(HCC).    SURGICAL HISTORY   has a past surgical history that includes hernia repair; primary c section; abdominal hysterectomy total; gyn surgery; tendon repair (Left, 03/2019); knee arthroscopy (2012); cataract extraction with iol (Bilateral); and wrist arthroscop,release xvers lig (Right, 5/1/2019).    SOCIAL HISTORY  Social History     Tobacco Use   • Smoking status: Never Smoker   • Smokeless tobacco: Never Used   Substance and Sexual Activity   • Alcohol use: No   • Drug use: No   • Sexual activity: Not on file       Family Hx:  Denies history of aneurysms, TAD, ACS/CAD, and PE/DVT      REVIEW OF SYSTEMS  See HPI for further details.  All other systems are negative except as above in HPI.    PHYSICAL EXAM  VITAL SIGNS: /70   Pulse 76   Temp 36.5 °C (97.7 °F) (Temporal)   Resp 18   Ht 1.575 m (5' 2\")   Wt 76.2 kg (167 lb 15.9 oz)   SpO2 95%   BMI 30.73 kg/m²     General:  WD overweight, nontoxic appearing in NAD; A+Ox3; V/S as above  Skin: warm and dry; good color; no rash  HEENT: NCAT; EOMs intact; PERRL; no scleral icterus   Neck: FROM; soft, supple, no JVD  Cardiovascular: Regular heart rate and rhythm.  No murmurs, rubs, or gallops; pulses 2+ bilaterally radially and DP areas  Chest wall: Nontender without crepitus  Lungs: Clear to auscultation with good air movement bilaterally.  No wheezes, rhonchi, or rales.   Abdomen: BS present; soft; NTND; no rebound, guarding, or rigidity.  No organomegaly or pulsatile mass  Extremities: KC x 4; no e/o trauma; no pedal edema; neg Tacho's  Neurologic: CNs III-XII grossly intact; speech clear; distal sensation intact; strength 5/5 UE/LEs  Psychiatric: Appropriate affect, normal mood    LABS  Results for orders placed or performed during the hospital encounter of 10/27/20   CBC with Differential   Result Value Ref Range    WBC 9.6 4.8 - 10.8 K/uL    RBC 4.89 4.20 - 5.40 M/uL    Hemoglobin 13.5 12.0 - 16.0 g/dL    Hematocrit 42.2 37.0 - 47.0 %    MCV 86.3 " 81.4 - 97.8 fL    MCH 27.6 27.0 - 33.0 pg    MCHC 32.0 (L) 33.6 - 35.0 g/dL    RDW 42.1 35.9 - 50.0 fL    Platelet Count 397 164 - 446 K/uL    MPV 10.0 9.0 - 12.9 fL    Neutrophils-Polys 81.70 (H) 44.00 - 72.00 %    Lymphocytes 12.60 (L) 22.00 - 41.00 %    Monocytes 4.90 0.00 - 13.40 %    Eosinophils 0.10 0.00 - 6.90 %    Basophils 0.30 0.00 - 1.80 %    Immature Granulocytes 0.40 0.00 - 0.90 %    Nucleated RBC 0.00 /100 WBC    Neutrophils (Absolute) 7.83 (H) 2.00 - 7.15 K/uL    Lymphs (Absolute) 1.21 1.00 - 4.80 K/uL    Monos (Absolute) 0.47 0.00 - 0.85 K/uL    Eos (Absolute) 0.01 0.00 - 0.51 K/uL    Baso (Absolute) 0.03 0.00 - 0.12 K/uL    Immature Granulocytes (abs) 0.04 0.00 - 0.11 K/uL    NRBC (Absolute) 0.00 K/uL   Complete Metabolic Panel (CMP)   Result Value Ref Range    Sodium 139 135 - 145 mmol/L    Potassium 4.2 3.6 - 5.5 mmol/L    Chloride 102 96 - 112 mmol/L    Co2 25 20 - 33 mmol/L    Anion Gap 12.0 7.0 - 16.0    Glucose 123 (H) 65 - 99 mg/dL    Bun 12 8 - 22 mg/dL    Creatinine 0.66 0.50 - 1.40 mg/dL    Calcium 10.2 8.5 - 10.5 mg/dL    AST(SGOT) 68 (H) 12 - 45 U/L    ALT(SGPT) 19 2 - 50 U/L    Alkaline Phosphatase 112 (H) 30 - 99 U/L    Total Bilirubin 0.3 0.1 - 1.5 mg/dL    Albumin 4.8 3.2 - 4.9 g/dL    Total Protein 7.8 6.0 - 8.2 g/dL    Globulin 3.0 1.9 - 3.5 g/dL    A-G Ratio 1.6 g/dL   Troponin   Result Value Ref Range    Troponin T 530 (H) 6 - 19 ng/L   ESTIMATED GFR   Result Value Ref Range    GFR If African American >60 >60 mL/min/1.73 m 2    GFR If Non African American >60 >60 mL/min/1.73 m 2   EKG (NOW)   Result Value Ref Range    Report       [facility] Emergency Dept.    Test Date:  2020-10-27  Pt Name:    EVARISTO HAMM                 Department: ER  MRN:        [id number]                      Room:  Gender:     Female                       Technician: [id number]  :        1960                   Requested By:ER TRIAGE PROTOCOL  Order #:    [id number]                     Reading MD: MARY JO FLORES MD    Measurements  Intervals                                Axis  Rate:       78                           P:          71  DE:         163                          QRS:        10  QRSD:       74                           T:          56  QT:         378  QTc:        431    Interpretive Statements  Sinus rhythm  Anteroseptal infarct, old  No previous ECG available for comparison  Electronically Signed On 10- 18:22:13 PDT by MARY JO FLORES MD     EKG   Result Value Ref Range    Report       [facility] Emergency Dept.    Test Date:  2020-10-27  Pt Name:    RACHEL REICH                 Department: ER  MRN:        [id number]                      Room:       [facility]  Gender:     Female                       Technician: [facility]  :        1960                   Requested By:MARY JO FLORES  Order #:    [id number]                    Reading MD: MARY JO FLORES MD    Measurements  Intervals                                Axis  Rate:       69                           P:          64  DE:         168                          QRS:        -3  QRSD:       68                           T:          38  QT:         388  QTc:        416    Interpretive Statements  SINUS RHYTHM  CONSIDER ANTEROSEPTAL INFARCT  BASELINE WANDER IN LEAD(S) V1,V5  Compared to ECG 10/27/2020 16:36:29  No significant changes  Electronically Signed On 10- 19:20:21 PDT by MARY JO FLORES MD         IMAGING  DX-CHEST-PORTABLE (1 VIEW)   Final Result      Upper normal heart size without consolidation detected        MEDICAL RECORD  I have reviewed patient's medical record and pertinent results are listed below.      COURSE & MEDICAL DECISION MAKING  I have reviewed any medical record information, laboratory studies and radiographic results as noted.    Rachel Reich is a 60 y.o. female with a history of dyslipidemia who presents complaining of nausea, chest pain, diaphoresis intermittently  today.  Denies diabetes, hypertension, and tobacco use.  No first-degree family history of CAD.    Appropriate PPE was worn at all times while interacting with the patient, including goggles, N95 mask, and surgical mask.    I had seen the EKG of the patient which showed a non-STEMI and asked that the one from urgent care which was deemed abnormal be obtained.    I was called by the RN taking care of the patient who is now in bed 17 for an elevated troponin/critical value of 500+.  I immediately went to see the patient who denied current chest pain.  I advised her of her abnormal results and she gave me her history as above.  During this time, she began complaining of nausea.  I have ordered Zofran and aspirin, also a repeat EKG.    Repeat EKG demonstrates no acute ST changes.    I discussed the case with the cardiologist, Dr. Seals, who advises admission to the hospitalist service.  He will see the patient.    I contacted the clinical pharmacist regarding heparin orders.  I will place these.    I discussed the case with the triage hospitalist who agrees to admit the patient.    The total critical care time on this patient is 40 minutes, speaking with the admitting physician and consultant, and deciphering test results including multiple EKGs. This 40 minutes is exclusive of separately billable procedures.    FINAL IMPRESSION  1. ACS (acute coronary syndrome) (HCC)  REFERRAL TO INTENSIVE CARDIAC REHAB/CARDIAC REHAB   2. Chest pain, unspecified type  REFERRAL TO INTENSIVE CARDIAC REHAB/CARDIAC REHAB   3. Elevated troponin  REFERRAL TO INTENSIVE CARDIAC REHAB/CARDIAC REHAB     Electronically signed by: Cathie Rosenthal M.D., 10/27/2020 7:20 PM

## 2020-10-28 NOTE — PROGRESS NOTES
Labs collected and tubed to lab. Water given. All needs met at this time. Will continue to monitor.

## 2020-10-28 NOTE — PROCEDURES
Cardiac Catheterization report    10/28/2020  3:43 PM    Referring MD:     Primary Care Provider: Ilda Yi D.O.    Indication for procedure: Non-ST elevation myocardial infarction    Procedures performed:  ·  Coronary arteriograms  · Left heart catheterization and Left ventriculogram     Final impression:  Angiographically normal appearing coronary arteries.  Stress cardiomyopathy    Recommendations:  Guideline directed medical therapy   Cardiovascular Risk factor modification    Findings:  1.  Left main coronary artery:  Normal.  2.  Left anterior descending artery:  Normal. LAD gives one small diagonal branch, which is normal.  3.  Left circumflex coronary artery:  Normal. Gives two marginal branches, which have no significant disease   4.  Right coronary artery:  Normal.  This is a right dominant system.  5.  Left ventricular end diastolic pressure:  28 mmHg.  No signficant gradient across the aortic valve.  6.  Left ventriculogram:  Ejection fraction of 45%, apical ballooning, stress cardiomyopathy pattern.      EBL: <10 CC    Specimens: None    Procedure details:  The risks and benefits of cardiac catheterization and possible intervention were explained to the patient including death, heart attack, stroke, and emergency surgery.  The patient verbalized understanding and wished to proceed.  The patient was brought to the cardiac catheterization laboratory in the fasting state and prepped and draped in the usual sterile fashion.  The right wrist was locally anesthetized with lidocaine and the right radial artery was cannulated with 5/6-[place] equipment and standard radial cocktail was given.  Coronary angiography was performed using JR 4 and JL 3.5  diagnostic catheters in the usual fashion, results mentioned below.  JR 4 catheter was used to cross the aortic valve to perform  left heart catheterization and left ventriculogram.    Once all the views were obtained, all wires and catheters  were removed from the patient without difficulty.  A Vasc-Band was placed over the right radial artery and the radial artery sheath was removed without difficulty.      Complications:  None    Sedation time:  I supervised moderate sedation over a trained independent nursing staff,  Sedation Start time:  15:24   Sedation Stop time: 15:37      KIRK Correa  [facility] of heart and vascular health

## 2020-10-28 NOTE — PROGRESS NOTES
[facility] TRIAGE OFFICER ER REPORT  Consult/Admission requested by: Dr. Rosenthal  Chief complaint: Chest pain  Pertinent history/ER Course: 60-year-old female with possible history of dyslipidemia who presented to the hospital with complaint of chest pain.  She found to have significant elevation of troponin.  ER physician requested consult with cardiology.  ERP is going to start her on heparin with a diagnosis of NSTEMI.  Code Status: Full per ERP, I personally verified with the ERP patient's code status and the ERP has confirmed this with the patient.   Patient meets admission criterion: Yes..  Recommendations given or work up & consultations requested per triage officer: Cardiologist will evaluate this patient  Consultants involved and pertinent input from consultants: Cardiology recommendations pending.  Admission status: Inpatient.   Admission order placed: Yes.   Floor requested: Tele  Assigned hospitalist: Dr. Del Valle

## 2020-10-28 NOTE — PROGRESS NOTES
Hospital Medicine Daily Progress Note    Date of Service  10/28/2020    Chief Complaint  60 y.o. female admitted 10/27/2020 with chest pain    Hospital Course    10/28/2020: Patient with findings consistent with NSTEMI currently undergoing left heart catheterization with possible intervention.      Interval Problem Update  The patient was seen and examined at bedside this morning and states that her chest pain is currently well controlled.  She denies any shortness of breath or palpitations.  No other overnight events reported by nursing staff.    Consultants/Specialty  Cardiology    Code Status  Full Code    Disposition  Continue current management    Review of Systems  Review of Systems   Constitutional: Negative for chills, fever and malaise/fatigue.   HENT: Negative for congestion, hearing loss, sore throat and tinnitus.    Eyes: Negative for blurred vision, double vision, photophobia and discharge.   Respiratory: Negative for cough, hemoptysis, sputum production, shortness of breath and wheezing.    Cardiovascular: Positive for chest pain. Negative for palpitations, orthopnea, claudication and leg swelling.   Gastrointestinal: Negative for abdominal pain, blood in stool, diarrhea, heartburn, melena, nausea and vomiting.   Genitourinary: Negative for dysuria, flank pain, hematuria and urgency.   Musculoskeletal: Negative for back pain, joint pain and myalgias.   Skin: Negative for itching and rash.   Neurological: Negative for dizziness, sensory change, speech change, weakness and headaches.   Endo/Heme/Allergies: Does not bruise/bleed easily.   Psychiatric/Behavioral: Negative for depression and suicidal ideas.        Physical Exam  Temp:  [36.3 °C (97.3 °F)-37.2 °C (98.9 °F)] 36.4 °C (97.5 °F)  Pulse:  [60-86] 61  Resp:  [16-20] 17  BP: (131-166)/() 139/74  SpO2:  [90 %-96 %] 93 %    Physical Exam  Vitals signs and nursing note reviewed.   Constitutional:       General: She is not in acute distress.      Appearance: Normal appearance. She is obese.   HENT:      Head: Normocephalic and atraumatic.      Mouth/Throat:      Mouth: Mucous membranes are moist.   Eyes:      Extraocular Movements: Extraocular movements intact.      Conjunctiva/sclera: Conjunctivae normal.      Pupils: Pupils are equal, round, and reactive to light.   Neck:      Musculoskeletal: Normal range of motion and neck supple.   Cardiovascular:      Rate and Rhythm: Normal rate and regular rhythm.      Heart sounds: No murmur. No friction rub.   Pulmonary:      Effort: Pulmonary effort is normal. No respiratory distress.      Breath sounds: Normal breath sounds. No wheezing.   Abdominal:      General: Abdomen is flat. Bowel sounds are normal.      Palpations: Abdomen is soft.      Tenderness: There is no abdominal tenderness. There is no guarding.   Musculoskeletal: Normal range of motion.         General: No swelling or tenderness.   Skin:     General: Skin is warm and dry.      Findings: No rash.   Neurological:      General: No focal deficit present.      Mental Status: She is alert and oriented to person, place, and time.      Cranial Nerves: No cranial nerve deficit.      Motor: No weakness.   Psychiatric:         Mood and Affect: Mood normal.         Behavior: Behavior normal.         Fluids  No intake or output data in the 24 hours ending 10/28/20 1412    Laboratory  Recent Labs     10/27/20  1723 10/28/20  0356   WBC 9.6 8.5   RBC 4.89 4.21   HEMOGLOBIN 13.5 11.9*   HEMATOCRIT 42.2 37.3   MCV 86.3 88.6   MCH 27.6 28.3   MCHC 32.0* 31.9*   RDW 42.1 44.3   PLATELETCT 397 338   MPV 10.0 10.0     Recent Labs     10/27/20  1723 10/28/20  0356   SODIUM 139 138   POTASSIUM 4.2 3.5*   CHLORIDE 102 103   CO2 25 25   GLUCOSE 123* 121*   BUN 12 12   CREATININE 0.66 0.67   CALCIUM 10.2 8.8     Recent Labs     10/27/20  2035   APTT 28.4   INR 0.92         Recent Labs     10/28/20  0356   TRIGLYCERIDE 213*   HDL 42   LDL 65       Imaging  EC-ECHOCARDIOGRAM  COMPLETE W/ CONT         DX-CHEST-PORTABLE (1 VIEW)   Final Result      Upper normal heart size without consolidation detected      CL-LEFT HEART CATHETERIZATION WITH POSSIBLE INTERVENTION    (Results Pending)        Assessment/Plan  * NSTEMI (non-ST elevated myocardial infarction) (HCC)- (present on admission)  Assessment & Plan  The patient initially presented with progressive, exertional chest pain.  Initial EKG in the ED does show T wave flattening however no overt ST segment elevations in the setting of elevated troponins.  These findings are consistent with NSTEMI  -Cardiology is been informed and she is currently undergoing left heart catheterization this afternoon  -Continue aspirin, statin, beta-blocker  -Initiate lisinopril after procedure    Dyslipidemia- (present on admission)  Assessment & Plan  Continue Atorvastatin. Patient does not remember when she last had her lipids checked. Check Lipid profile in AM.     Major depressive disorder- (present on admission)  Assessment & Plan  Patient without suicidal ideation at this time  -Continue home dose of Wellbutrin and Celexa    Anxiety  Assessment & Plan  Stable. Continue Celexa         VTE prophylaxis: ambulation and scds, low risk

## 2020-10-28 NOTE — CONSULTS
Cardiology Consultation Note      Date of service: 10/27/2020      Requesting Physician: Dr. Cathie Rosenthal      Reason for consultation: NSTEMI      History of present illness    Rachel Reich is a 60 y.o. female with history of dyslipidemia who was sent from an urgent care for evaluation of chest pain and inclusive EKG     The patient reports that she has been waking up with right arm pain and numbness for the last 3 weeks or so.  They last pretty much all morning. She attributed this to her neck and back issue, although they are not related to any position or certain movement. Se denies neck pain but has some left hip pain in the past.  .    Today, however, she has also been noticing rather constant chest pressure/heaviness associated with diaphoresis. The discomfort does appear to be more noticeable when she walks or exerts herself.  She denies any recent unusual strenuous activity or flulike symptoms.      She denied on compliant with medication.      Denies diabetes mellitus or tobacco use.      Her maternal grandfather had myocardial infarction in his early 40s but both of her parents are still alive and well in the 80s.          No Known Allergies    @HOMEMEDS      Current Facility-Administered Medications:   •  nitroglycerin (NITROSTAT) tablet 0.4 mg, 0.4 mg, Sublingual, Once, Cathie Rosenthal M.D.  •  NS infusion 250 mL, 250 mL, Intravenous, Once, Cathie Rosenthal M.D.  •  heparin infusion 25,000 units in 500 mL 0.45% NACL, 0-30 Units/kg/hr (Adjusted), Intravenous, Continuous, Cathie Rosenthal M.D.  •  heparin injection 3,600 Units, 60 Units/kg (Adjusted), Intravenous, Once, Cathie Rosenthal M.D.  •  heparin injection 1,800 Units, 30 Units/kg (Adjusted), Intravenous, PRN, Cathie Rosenthal M.D.  •  [START ON 10/28/2020] atorvastatin (LIPITOR) tablet 40 mg, 40 mg, Oral, DAILY, Maureen Del Valle M.D.  •  [START ON 10/28/2020] buPROPion SR (WELLBUTRIN-SR) tablet 150 mg, 150 mg, Oral, DAILY, Maureen Del Valle  "M.D.  •  [START ON 10/28/2020] citalopram (CeleXA) tablet 20 mg, 20 mg, Oral, DAILY, Maureen Del Valle M.D.  •  [START ON 10/28/2020] omeprazole (PRILOSEC) capsule 20 mg, 20 mg, Oral, DAILY, Maureen Del Valle M.D.  •  senna-docusate (PERICOLACE or SENOKOT S) 8.6-50 MG per tablet 2 Tab, 2 Tab, Oral, BID **AND** polyethylene glycol/lytes (MIRALAX) PACKET 1 Packet, 1 Packet, Oral, QDAY PRN **AND** magnesium hydroxide (MILK OF MAGNESIA) suspension 30 mL, 30 mL, Oral, QDAY PRN **AND** bisacodyl (DULCOLAX) suppository 10 mg, 10 mg, Rectal, QDAY PRN, Maureen Del Valle M.D.  •  lactated ringers infusion, , Intravenous, Continuous, Maureen Del Valle M.D.  •  [START ON 10/28/2020] aspirin EC (ECOTRIN) tablet 81 mg, 81 mg, Oral, DAILY, Maureen Del Valle M.D.  •  metoprolol (LOPRESSOR) tablet 25 mg, 25 mg, Oral, TWICE DAILY, Maureen Del Valle M.D.    Current Outpatient Medications:   •  atorvastatin (LIPITOR) 40 MG Tab, Take 40 mg by mouth every day., Disp: , Rfl:   •  citalopram (CELEXA) 20 MG Tab, Take 20 mg by mouth every day., Disp: , Rfl:   •  omeprazole (PRILOSEC) 20 MG delayed-release capsule, Take 20 mg by mouth every day., Disp: , Rfl:   •  vitamin D, Ergocalciferol, (DRISDOL) 35109 units Cap capsule, Take 50,000 Units by mouth every Monday., Disp: , Rfl:   •  buPROPion SR (WELLBUTRIN-SR) 150 MG TB12, Take 150 mg by mouth every day., Disp: , Rfl:     Past Medical History:   Diagnosis Date   • Allergy    • Anesthesia     \"takes a long time to wake up\"   • Anxiety    • Arthritis    • Cataract     bilat IOL   • Depression    • Depression    • GERD (gastroesophageal reflux disease)    • High cholesterol    • Melanoma (HCC)     right neck area        Past Surgical History:   Procedure Laterality Date   • PB WRIST ARTHROSCOP,RELEASE XVERS LIG Right 5/1/2019    Procedure: RELEASE, CARPAL TUNNEL, ENDOSCOPIC;  Surgeon: Winston Alvarez M.D.;  Location: SURGERY SAME DAY [facility];  Service: Orthopedics   • TENDON REPAIR Left 03/2019   • KNEE " ARTHROSCOPY  2012   • ABDOMINAL HYSTERECTOMY TOTAL     • CATARACT EXTRACTION WITH IOL Bilateral    • GYN SURGERY      C Section    • HERNIA REPAIR     • PRIMARY C SECTION         History reviewed. No pertinent family history.    Social History     Socioeconomic History   • Marital status: Single     Spouse name: Not on file   • Number of children: Not on file   • Years of education: Not on file   • Highest education level: Not on file   Occupational History   • Not on file   Social Needs   • Financial resource strain: Not on file   • Food insecurity     Worry: Not on file     Inability: Not on file   • Transportation needs     Medical: Not on file     Non-medical: Not on file   Tobacco Use   • Smoking status: Never Smoker   • Smokeless tobacco: Never Used   Substance and Sexual Activity   • Alcohol use: No   • Drug use: No   • Sexual activity: Not on file   Lifestyle   • Physical activity     Days per week: Not on file     Minutes per session: Not on file   • Stress: Not on file   Relationships   • Social connections     Talks on phone: Not on file     Gets together: Not on file     Attends Oriental orthodox service: Not on file     Active member of club or organization: Not on file     Attends meetings of clubs or organizations: Not on file     Relationship status: Not on file   • Intimate partner violence     Fear of current or ex partner: Not on file     Emotionally abused: Not on file     Physically abused: Not on file     Forced sexual activity: Not on file   Other Topics Concern   • Not on file   Social History Narrative   • Not on file       Review of systems;    General: No fever, chills, no recent weight change, no weakness or fatigue  HENT: No discharge, no ringing in the ears, no toothache or sore throat, no neck pain  Eyes: No redness, no blurred vision or double vision  Heart: No palpitation, no PND or orthopnea, no claudication, no leg swelling  Lung: No productive cough, no hemoptysis  Abdomen: No abdominal  "pain, no nausea vomiting or diarrhea, no blood in stool  : No dysuria, no frequency or hematuria  Musculoskeletal: No myalgia, no back pain, some joint pain  Hematology: No easy bruising  Skin: No rash or itching  Neurological: No headache, no new focal weakness or numbness  Psychological: Denies depression, anxiety or insomnia  All other review of systems are negative    Her initial troponin here is 530    EKG by my review showed SR, QS in V1-V2 but no apparent ST changes      Labs CMP AST 68 glucose 123 Alk phos 112, the rest NL  WBC 9.6    CXR upper NL heart size but no significant infiltrate    Vitals:    10/27/20 1634 10/27/20 1648 10/27/20 1822   BP: (!) 166/95  155/70   Pulse: 83  76   Resp: 16  18   Temp: 36.5 °C (97.7 °F)     TempSrc: Temporal     SpO2: 96%  95%   Weight:  76.2 kg (167 lb 15.9 oz)    Height: 1.575 m (5' 2\")       GENERAL not in acute distress, not dyspnic at rest  Head atraumatic, normocephalic  Eyes EOMI  ENT neck supple, no JVD, no carotid bruits or thyromegaly  Lung good expansion, distant sound, no rales or wheezing  Heart RRR, normal rate, no murmur, gallop or rub  Abd soft, no tenderness, mass or bruits  Ext no edema  Skin no ecchymosis or petechiae  Musculoskeletal no deformity  Neuro grossly intact  Psych normal mood, normal affect      Assessment and plans    1.  Non-ST elevation myocardial infarction  EKG suggests septal infarct although there is no ST elevation at this time.  I believe the best course of action is to proceed with coronary angiography in the morning.  In the meantime, I would start her on IV heparin, aspirin, beta-blocker and statin.  Monitor closely overnight.  Certainly chest pain recurs especially if associated with new ST elevation, we will proceed with the procedure immediately tonight.  Risks and benefits of the procedure were discussed at length.   The patient and available family member understood, accepted the risks and wishes to proceed.     2.  " History of dyslipidemia  Check lipid.  Increase atorvastatin to 80 mg daily    Will follow the patient along with you.  Thank you consultation.    Please note that this dictation was created using voice recognition software. I have worked with consultants from the vendor as well as technical experts from [employer] [employer] to optimize the interface. I have made every reasonable attempt to correct obvious errors, but I expect that there are errors of grammar and possibly content I did not discover before finalizing the note

## 2020-10-28 NOTE — PROGRESS NOTES
Patient medicated per MAR. Patient transferred to T 727-1 with ACLS RN Manny. All belongings sent with patient

## 2020-10-28 NOTE — PROGRESS NOTES
Received report from MEHNAZ Barillas. Pt arrived to unit at 0640 via hospital bed escorted by MEHNAZ Bryant. VSS. No signs of distress noted at this time. Tele monitor in place. Monitor room notified. Pt denied any c/o pain. Fall precautions and appropriate signs in place. Pt oriented to unit routine, call light/phone system within reach. Personal belongings within reach. RN extension number provided. Pt educated regarding fall precautions. Bed alarm is on. Pt denies any further needs at this time.

## 2020-10-28 NOTE — H&P
"Hospital Medicine History & Physical Note    Date of Service  10/27/2020    Primary Care Physician  Ilda Yi D.O.    Consultants  Cardiololgy    Code Status  Full Code    Chief Complaint  Chief Complaint   Patient presents with   • Sent by MD     Pt sent by MD due to \"inconclusive\" EKG   • Nausea     Having intermittent episodes of nausea since this morning   • Chest Pain     Substernal CP that started this morning, pt describes it as a \"heavy pressure\" or a \"scared feeling\"   • Arm Pain     PT reports after CP, R arm shapr pain and numbness from R arm to R shoulder       History of Presenting Illness  60 y.o. female who presented 10/27/2020 with chest pain/heaviness and nausea which started early this morning. She initially went to Urgent Care in [place], and was sent to the ER. On Admission, Troponin was 530.  Chest x-ray was unremarkable.  Cardiology were consulted.  She was started on a heparin drip.    She has a history of depression, anxiety, and dyslipidemia.    Review of Systems  Review of Systems   Constitutional: Negative.    HENT: Negative.    Eyes: Negative.    Respiratory: Negative.    Cardiovascular: Positive for chest pain.        Chest feels better but still heavy   Gastrointestinal: Negative for nausea.        Nausea resolved with medication received at Urgent Care   Genitourinary: Negative.    Musculoskeletal: Negative.    Skin: Negative.    Neurological: Negative.    Endo/Heme/Allergies: Negative.    Psychiatric/Behavioral: Negative.        Past Medical History   has a past medical history of Allergy, Anesthesia, Anxiety, Arthritis, Cataract, Depression, Depression, GERD (gastroesophageal reflux disease), High cholesterol, and Melanoma (HCC).    Surgical History   has a past surgical history that includes hernia repair; primary c section; abdominal hysterectomy total; gyn surgery; tendon repair (Left, 03/2019); knee arthroscopy (2012); cataract extraction with iol (Bilateral); and pr wrist " arthroscop,release xvers lig (Right, 5/1/2019).     Family History  family history is not on file.     Social History   reports that she has never smoked. She has never used smokeless tobacco. She reports that she does not drink alcohol or use drugs.    Allergies  No Known Allergies    Medications  Prior to Admission Medications   Prescriptions Last Dose Informant Patient Reported? Taking?   atorvastatin (LIPITOR) 40 MG Tab 10/27/2020 at 0800  Yes No   Sig: Take 40 mg by mouth every day.   buPROPion SR (WELLBUTRIN-SR) 150 MG TB12 10/27/2020 at 0800  Yes No   Sig: Take 150 mg by mouth every day.   citalopram (CELEXA) 20 MG Tab 10/27/2020 at 0800  Yes No   Sig: Take 20 mg by mouth every day.   omeprazole (PRILOSEC) 20 MG delayed-release capsule 10/27/2020 at 0800  Yes No   Sig: Take 20 mg by mouth every day.   vitamin D, Ergocalciferol, (DRISDOL) 94625 units Cap capsule 10/26/2020 at 0800  Yes No   Sig: Take 50,000 Units by mouth every Monday.      Facility-Administered Medications: None       Physical Exam  Temp:  [36.5 °C (97.7 °F)] 36.5 °C (97.7 °F)  Pulse:  [76-83] 76  Resp:  [16-18] 18  BP: (155-166)/(70-95) 155/70  SpO2:  [95 %-96 %] 95 %    Physical Exam  Constitutional:       Appearance: She is obese.   HENT:      Head: Normocephalic.      Nose: Nose normal.      Mouth/Throat:      Mouth: Mucous membranes are moist.   Eyes:      Extraocular Movements: Extraocular movements intact.      Pupils: Pupils are equal, round, and reactive to light.   Neck:      Musculoskeletal: Normal range of motion.   Cardiovascular:      Rate and Rhythm: Normal rate.   Pulmonary:      Effort: Pulmonary effort is normal.      Breath sounds: Normal breath sounds.   Abdominal:      Palpations: Abdomen is soft.   Musculoskeletal:      Right lower leg: No edema.      Left lower leg: No edema.   Neurological:      General: No focal deficit present.      Mental Status: She is alert.   Psychiatric:         Mood and Affect: Mood normal.          Behavior: Behavior normal.         Laboratory:  Recent Labs     10/27/20  1723   WBC 9.6   RBC 4.89   HEMOGLOBIN 13.5   HEMATOCRIT 42.2   MCV 86.3   MCH 27.6   MCHC 32.0*   RDW 42.1   PLATELETCT 397   MPV 10.0     Recent Labs     10/27/20  1723   SODIUM 139   POTASSIUM 4.2   CHLORIDE 102   CO2 25   GLUCOSE 123*   BUN 12   CREATININE 0.66   CALCIUM 10.2     Recent Labs     10/27/20  1723   ALTSGPT 19   ASTSGOT 68*   ALKPHOSPHAT 112*   TBILIRUBIN 0.3   GLUCOSE 123*     Recent Labs     10/27/20  2035   APTT 28.4   INR 0.92     No results for input(s): NTPROBNP in the last 72 hours.      Recent Labs     10/27/20  1723   TROPONINT 530*       Imaging:  DX-CHEST-PORTABLE (1 VIEW)   Final Result      Upper normal heart size without consolidation detected            Assessment/Plan:  I anticipate this patient will require at least two midnights for appropriate medical management, necessitating inpatient admission.    * ACS (acute coronary syndrome) (HCC)- (present on admission)  Assessment & Plan  NSTEMI. Elevated troponin.  Chest pain/heaviness has improved.  Cardiology were consulted from ER. Heparin drip was ordered. Received aspirin. Continue Atorvastatin and supplemental oxygen. Follow troponin. Monitor on telemetry.  Awaiting cardiology evaluation and recommendations    Dyslipidemia- (present on admission)  Assessment & Plan  Continue Atorvastatin. Patient does not remember when she last had her lipids checked. Check Lipid profile in AM.     Major depressive disorder- (present on admission)  Assessment & Plan  Stable. Continue Wellbutrin.     Anxiety  Assessment & Plan  Stable. Continue Celexa

## 2020-10-29 VITALS
TEMPERATURE: 97 F | DIASTOLIC BLOOD PRESSURE: 77 MMHG | HEIGHT: 62 IN | OXYGEN SATURATION: 93 % | BODY MASS INDEX: 30.95 KG/M2 | WEIGHT: 168.21 LBS | HEART RATE: 63 BPM | RESPIRATION RATE: 18 BRPM | SYSTOLIC BLOOD PRESSURE: 139 MMHG

## 2020-10-29 PROBLEM — I51.81 STRESS-INDUCED CARDIOMYOPATHY: Status: ACTIVE | Noted: 2020-10-27

## 2020-10-29 PROBLEM — I51.81 STRESS-INDUCED CARDIOMYOPATHY: Status: RESOLVED | Noted: 2020-10-27 | Resolved: 2020-10-29

## 2020-10-29 LAB
ANION GAP SERPL CALC-SCNC: 8 MMOL/L (ref 7–16)
BUN SERPL-MCNC: 12 MG/DL (ref 8–22)
CALCIUM SERPL-MCNC: 8.8 MG/DL (ref 8.5–10.5)
CHLORIDE SERPL-SCNC: 106 MMOL/L (ref 96–112)
CO2 SERPL-SCNC: 24 MMOL/L (ref 20–33)
CREAT SERPL-MCNC: 0.62 MG/DL (ref 0.5–1.4)
ERYTHROCYTE [DISTWIDTH] IN BLOOD BY AUTOMATED COUNT: 44.6 FL (ref 35.9–50)
GLUCOSE SERPL-MCNC: 101 MG/DL (ref 65–99)
HCT VFR BLD AUTO: 37.5 % (ref 37–47)
HGB BLD-MCNC: 11.6 G/DL (ref 12–16)
MAGNESIUM SERPL-MCNC: 2.1 MG/DL (ref 1.5–2.5)
MCH RBC QN AUTO: 27.5 PG (ref 27–33)
MCHC RBC AUTO-ENTMCNC: 30.9 G/DL (ref 33.6–35)
MCV RBC AUTO: 88.9 FL (ref 81.4–97.8)
PHOSPHATE SERPL-MCNC: 3.2 MG/DL (ref 2.5–4.5)
PLATELET # BLD AUTO: 291 K/UL (ref 164–446)
PMV BLD AUTO: 10.1 FL (ref 9–12.9)
POTASSIUM SERPL-SCNC: 4.1 MMOL/L (ref 3.6–5.5)
RBC # BLD AUTO: 4.22 M/UL (ref 4.2–5.4)
SODIUM SERPL-SCNC: 138 MMOL/L (ref 135–145)
WBC # BLD AUTO: 7.8 K/UL (ref 4.8–10.8)

## 2020-10-29 PROCEDURE — 700102 HCHG RX REV CODE 250 W/ 637 OVERRIDE(OP): Performed by: INTERNAL MEDICINE

## 2020-10-29 PROCEDURE — A9270 NON-COVERED ITEM OR SERVICE: HCPCS | Performed by: STUDENT IN AN ORGANIZED HEALTH CARE EDUCATION/TRAINING PROGRAM

## 2020-10-29 PROCEDURE — 85027 COMPLETE CBC AUTOMATED: CPT

## 2020-10-29 PROCEDURE — 99232 SBSQ HOSP IP/OBS MODERATE 35: CPT | Performed by: INTERNAL MEDICINE

## 2020-10-29 PROCEDURE — 84100 ASSAY OF PHOSPHORUS: CPT

## 2020-10-29 PROCEDURE — 83735 ASSAY OF MAGNESIUM: CPT

## 2020-10-29 PROCEDURE — A9270 NON-COVERED ITEM OR SERVICE: HCPCS | Performed by: INTERNAL MEDICINE

## 2020-10-29 PROCEDURE — 700102 HCHG RX REV CODE 250 W/ 637 OVERRIDE(OP): Performed by: STUDENT IN AN ORGANIZED HEALTH CARE EDUCATION/TRAINING PROGRAM

## 2020-10-29 PROCEDURE — 99239 HOSP IP/OBS DSCHRG MGMT >30: CPT | Performed by: INTERNAL MEDICINE

## 2020-10-29 PROCEDURE — 80048 BASIC METABOLIC PNL TOTAL CA: CPT

## 2020-10-29 PROCEDURE — 700105 HCHG RX REV CODE 258: Performed by: STUDENT IN AN ORGANIZED HEALTH CARE EDUCATION/TRAINING PROGRAM

## 2020-10-29 RX ORDER — METOPROLOL SUCCINATE 50 MG/1
50 TABLET, EXTENDED RELEASE ORAL DAILY
Qty: 30 TAB | Refills: 0 | Status: SHIPPED | OUTPATIENT
Start: 2020-10-30 | End: 2020-11-20 | Stop reason: SDUPTHER

## 2020-10-29 RX ORDER — ATORVASTATIN CALCIUM 40 MG/1
40 TABLET, FILM COATED ORAL EVERY EVENING
Status: DISCONTINUED | OUTPATIENT
Start: 2020-10-29 | End: 2020-10-29 | Stop reason: HOSPADM

## 2020-10-29 RX ORDER — LISINOPRIL 10 MG/1
10 TABLET ORAL
Status: DISCONTINUED | OUTPATIENT
Start: 2020-10-29 | End: 2020-10-29 | Stop reason: HOSPADM

## 2020-10-29 RX ORDER — METOPROLOL SUCCINATE 50 MG/1
50 TABLET, EXTENDED RELEASE ORAL
Status: DISCONTINUED | OUTPATIENT
Start: 2020-10-29 | End: 2020-10-29 | Stop reason: HOSPADM

## 2020-10-29 RX ORDER — LISINOPRIL 10 MG/1
10 TABLET ORAL DAILY
Qty: 30 TAB | Refills: 0 | Status: SHIPPED | OUTPATIENT
Start: 2020-10-30 | End: 2020-11-20 | Stop reason: SDUPTHER

## 2020-10-29 RX ADMIN — BUPROPION HYDROCHLORIDE 150 MG: 150 TABLET, EXTENDED RELEASE ORAL at 06:25

## 2020-10-29 RX ADMIN — ATORVASTATIN CALCIUM 80 MG: 80 TABLET, FILM COATED ORAL at 06:25

## 2020-10-29 RX ADMIN — LISINOPRIL 10 MG: 10 TABLET ORAL at 08:12

## 2020-10-29 RX ADMIN — ACETAMINOPHEN 325 MG: 325 TABLET, FILM COATED ORAL at 07:33

## 2020-10-29 RX ADMIN — OMEPRAZOLE 20 MG: 20 CAPSULE, DELAYED RELEASE ORAL at 06:26

## 2020-10-29 RX ADMIN — SODIUM CHLORIDE: 9 INJECTION, SOLUTION INTRAVENOUS at 00:57

## 2020-10-29 RX ADMIN — CITALOPRAM HYDROBROMIDE 20 MG: 20 TABLET ORAL at 06:25

## 2020-10-29 RX ADMIN — METOPROLOL TARTRATE 25 MG: 25 TABLET, FILM COATED ORAL at 06:25

## 2020-10-29 RX ADMIN — ASPIRIN 81 MG: 81 TABLET, COATED ORAL at 06:26

## 2020-10-29 RX ADMIN — METOPROLOL SUCCINATE 50 MG: 50 TABLET, EXTENDED RELEASE ORAL at 08:12

## 2020-10-29 ASSESSMENT — ENCOUNTER SYMPTOMS
ABDOMINAL PAIN: 0
COLOR CHANGE: 0
CHEST TIGHTNESS: 0
SHORTNESS OF BREATH: 0
TROUBLE SWALLOWING: 0
CONFUSION: 0
NERVOUS/ANXIOUS: 0
COUGH: 0
PALPITATIONS: 0
BLOOD IN STOOL: 0
AGITATION: 0
ABDOMINAL DISTENTION: 0
NUMBNESS: 0
DIZZINESS: 0
CHILLS: 0
FEVER: 0
DIAPHORESIS: 0

## 2020-10-29 NOTE — PROGRESS NOTES
Report received from day shift RN, assumed patient care @ 1930. Patient is A&O x 4, on room air. Tele box on and in place. Patient denies any pain at this time. Patient updated on plan of care and verbalizes no questions. Patient has call light within reach, fall precautions in place. Patient educated to call for assistance as needed. Will continue to monitor.

## 2020-10-29 NOTE — PROGRESS NOTES
TR Band removed from right radial site per order set. No drainage. Transparent pressure dressing applied. CDI

## 2020-10-29 NOTE — PROGRESS NOTES
Cardiology Follow Up Progress Note    Date of Service  10/29/2020    Attending Physician  Moses Lorenzo M.D.    Chief Complaint   Chest pain     HPI  Rachel Reich is a 60 y.o. female admitted 10/27/2020 with chest pain. Sent from urgent care. ekg suggest septal infarct and trop trending up 824.     History of dsylipidemia     Interim Events  Patient resting in bed, chest discomfort resolved. Denies any sob, dizziness or palpitations. Cath site is soft and good peripheral pulse     NSR on the monitor.   K 4.1  hgb 11.6    Review of Systems  Review of Systems   Constitutional: Negative for chills, diaphoresis and fever.   HENT: Negative for nosebleeds and trouble swallowing.    Respiratory: Negative for cough, chest tightness and shortness of breath.    Cardiovascular: Negative for chest pain, palpitations and leg swelling.   Gastrointestinal: Negative for abdominal distention, abdominal pain and blood in stool.   Genitourinary: Negative for hematuria.   Skin: Negative for color change.   Neurological: Negative for dizziness, syncope and numbness.   Psychiatric/Behavioral: Negative for agitation and confusion. The patient is not nervous/anxious.        Vital signs in last 24 hours  Temp:  [35.8 °C (96.4 °F)-36.4 °C (97.6 °F)] 36.1 °C (97 °F)  Pulse:  [57-88] 63  Resp:  [14-18] 18  BP: (112-154)/(60-87) 139/77  SpO2:  [90 %-95 %] 93 %    Physical Exam  Physical Exam  Vitals signs and nursing note reviewed.   Constitutional:       Appearance: Normal appearance.   HENT:      Head: Normocephalic and atraumatic.   Eyes:      Pupils: Pupils are equal, round, and reactive to light.   Neck:      Musculoskeletal: Normal range of motion.   Cardiovascular:      Rate and Rhythm: Normal rate and regular rhythm.      Heart sounds: Normal heart sounds. No murmur.   Pulmonary:      Effort: Pulmonary effort is normal.      Breath sounds: Normal breath sounds.   Abdominal:      General: Abdomen is flat.   Skin:     General: Skin  is warm and dry.   Neurological:      General: No focal deficit present.      Mental Status: She is alert and oriented to person, place, and time.   Psychiatric:         Mood and Affect: Mood normal.         Behavior: Behavior normal.         Thought Content: Thought content normal.         Judgment: Judgment normal.         Lab Review  Lab Results   Component Value Date/Time    WBC 7.8 10/29/2020 05:03 AM    RBC 4.22 10/29/2020 05:03 AM    HEMOGLOBIN 11.6 (L) 10/29/2020 05:03 AM    HEMATOCRIT 37.5 10/29/2020 05:03 AM    MCV 88.9 10/29/2020 05:03 AM    MCH 27.5 10/29/2020 05:03 AM    MCHC 30.9 (L) 10/29/2020 05:03 AM    MPV 10.1 10/29/2020 05:03 AM      Lab Results   Component Value Date/Time    SODIUM 138 10/29/2020 05:03 AM    POTASSIUM 4.1 10/29/2020 05:03 AM    CHLORIDE 106 10/29/2020 05:03 AM    CO2 24 10/29/2020 05:03 AM    GLUCOSE 101 (H) 10/29/2020 05:03 AM    BUN 12 10/29/2020 05:03 AM    CREATININE 0.62 10/29/2020 05:03 AM      Lab Results   Component Value Date/Time    ASTSGOT 80 (H) 10/28/2020 03:56 AM    ALTSGPT 21 10/28/2020 03:56 AM     Lab Results   Component Value Date/Time    CHOLSTRLTOT 150 10/28/2020 03:56 AM    LDL 65 10/28/2020 03:56 AM    HDL 42 10/28/2020 03:56 AM    TRIGLYCERIDE 213 (H) 10/28/2020 03:56 AM    TROPONINT 1553 (H) 10/28/2020 12:16 PM       No results for input(s): NTPROBNP in the last 72 hours.    Cardiac Imaging and Procedures Review  EKG:    SINUS RHYTHM   NONSPECIFIC T ABNORMALITIES, LATERAL LEADS   Compared to ECG 10/27/2020 18:44:12   T-wave abnormality now present   Myocardial infarct finding no longer present     Echocardiogram:    10/28/2020  No prior study is available for comparison.   Normal left ventricular systolic function. Left ventricular ejection   fraction is visually estimated to be 65%.  Normal diastolic function.  Estimated right ventricular systolic pressure  is 46 mmHg.  Normal inferior vena cava size and inspiratory collapse.        Cardiac  Catheterization:    10/28/2020  Angiographically normal appearing coronary arteries.  Stress cardiomyopathy  Findings:  1.  Left main coronary artery:  Normal.  2.  Left anterior descending artery:  Normal. LAD gives one small diagonal branch, which is normal.  3.  Left circumflex coronary artery:  Normal. Gives two marginal branches, which have no significant disease   4.  Right coronary artery:  Normal.  This is a right dominant system.  5.  Left ventricular end diastolic pressure:  28 mmHg.  No signficant gradient across the aortic valve.  6.  Left ventriculogram:  Ejection fraction of 45%, apical ballooning, stress cardiomyopathy pattern.    Assessment/Plan  No new Assessment & Plan notes have been filed under this hospital service since the last note was generated.  Service: Cardiology    1. NSTEMI due to stress cardiomyopathy:  - trop T peaked at 1553  - coronary angiogram showed normal angiographic coronary arteries. Stress cardiomyopathy   -  Continue asa 81mg qd and reduce atorvastatin 40mg qd ( home dose)   - switch metoprolol to 50mg XL   - ECHO showed ef 65% with LV on cath showed ef45% with apical ballooning. Repeat ECHO in 3 months with contrast.     2. Hyperlipidemia:  - continue statin therapy     3. Hypertension:  - continue bb as noted above  - start lisinopril 10mg qd (titrate ace for goal blood pressure)     Future Appointments   Date Time Provider Department Center   11/9/2020  3:40 PM MICHAEL Sotomayor [facility] None         Thank you for allowing me to participate in the care of this patient.  Cardiology will sign off.     Please contact me with any questions.    STACI Sotomayor   [facility]

## 2020-10-29 NOTE — DISCHARGE INSTRUCTIONS
Discharge Instructions    Discharged to home by car with self. Discharged via walking, hospital escort: Yes.  Special equipment needed: Not Applicable    Be sure to schedule a follow-up appointment with your primary care doctor or any specialists as instructed.     Discharge Plan:   Diet Plan: Discussed  Activity Level: Discussed  Confirmed Follow up Appointment: Patient to Call and Schedule Appointment  Confirmed Symptoms Management: Discussed  Medication Reconciliation Updated: Yes  Influenza Vaccine Indication: Not indicated: Previously immunized this influenza season and > 8 years of age    I understand that a diet low in cholesterol, fat, and sodium is recommended for good health. Unless I have been given specific instructions below for another diet, I accept this instruction as my diet prescription.   Other diet: low fat    Special Instructions: None    · Is patient discharged on Warfarin / Coumadin?   No     Depression / Suicide Risk    As you are discharged from this [facility] facility, it is important to learn how to keep safe from harming yourself.    Recognize the warning signs:  · Abrupt changes in personality, positive or negative- including increase in energy   · Giving away possessions  · Change in eating patterns- significant weight changes-  positive or negative  · Change in sleeping patterns- unable to sleep or sleeping all the time   · Unwillingness or inability to communicate  · Depression  · Unusual sadness, discouragement and loneliness  · Talk of wanting to die  · Neglect of personal appearance   · Rebelliousness- reckless behavior  · Withdrawal from people/activities they love  · Confusion- inability to concentrate     If you or a loved one observes any of these behaviors or has concerns about self-harm, here's what you can do:  · Talk about it- your feelings and reasons for harming yourself  · Remove any means that you might use to hurt yourself (examples: pills, rope, extension cords,  firearm)  · Get professional help from the community (Mental Health, Substance Abuse, psychological counseling)  · Do not be alone:Call your Safe Contact- someone whom you trust who will be there for you.  · Call your local CRISIS HOTLINE 989-7463 or 958-160-3207  · Call your local Children's Mobile Crisis Response Team [place] (524) 914-9110 or www.Repairogen  · Call the toll free National Suicide Prevention Hotlines   · National Suicide Prevention Lifeline 178-782-KCNN (5643)  · Mood[employer] Line Network 800-SUICIDE (457-4923)    Stress-Induced Cardiomyopathy    Stress-induced cardiomyopathy is a disease of the heart muscle that is caused by extreme stress. It is also known as broken heart syndrome, apical ballooning syndrome, or takotsubo cardiomyopathy.  Cardiomyopathy makes the heart muscle thick, weak, or stiff. In stress-induced cardiomyopathy, the heart weakens and bulges. When this happens, it cannot pump blood properly. People often mistake this condition for a heart attack because the most common symptom is chest pain (angina).  Stress-induced cardiomyopathy can lead to severe heart failure. However, the failure is usually short-term. Most people with this condition make a full recovery.  What are the causes?  The exact cause of this condition is not known. Symptoms are usually caused by extreme stress brought on by anger, surprise, or grief (such as the death of a loved one or a divorce). Body chemicals (hormones) released while experiencing stress may affect the heart's ability to pump blood.  What increases the risk?  You are more likely to develop this condition if:  · You are a woman, especially if you have gone through menopause (postmenopausal).  · You are  or white ().  · You have no history of heart disease.  What are the signs or symptoms?  Common symptoms of this condition include:  · Sudden angina.  · Shortness of breath.  Other symptoms include:  · Irregular or fast  heartbeats (arrhythmia).  · Fainting.  · Low blood pressure.  · The heart not being able to pump enough blood (cardiogenic shock).  · Heart failure.  How is this diagnosed?  This condition is diagnosed based on:  · Your symptoms and medical history.  · A physical exam.  · Blood testing. This is done to check for troponin levels in the blood. Troponins are proteins that are released when there is damage to the heart muscle.  · An electrocardiogram (ECG). This test is done to check for problems with electrical activity in the heart.  · An echocardiogram. This test involves making an image of your heart using sound waves. It can show how well your heart is pumping blood.  · Coronary angiogram. During this test, a thin tube (catheter) is inserted into a blood vessel, and dye is injected into your bloodstream (cardiac catheterization). The dye appears on an X-ray and shows how blood flows through the arteries in your heart.  · Ventriculogram. This test is done during a coronary angiogram. A dye is injected into your heart. The dye appears on an X-ray and shows how well the upper left chamber of the heart (left ventricle) can pump blood.  · Chest X-ray.  · MRI.  How is this treated?  This condition may be treated with:  · Medicines. These may include:  ? Anti-anxiety medicines to manage stress.  ? ACE inhibitors to lower blood pressure.  ? Diuretics to reduce swelling and prevent fluid buildup in the lungs.  ? Beta-blockers to lower blood pressure and heart rate. They also reduce the workload of the heart muscle.  · Lifestyle changes, such as managing stress and losing weight.  You may also be treated for complications caused by stress-induced cardiomyopathy, such as heart failure, shock, or heart rhythm problems.  Follow these instructions at home:  Medicines  · Take over-the-counter and prescription medicines only as told by your health care provider.  Eating and drinking  · Eat a healthy diet. This includes plenty of  fruits and vegetables, lean meats, whole grains, and fat-free or low-fat dairy products.  · Avoid foods that are high in salt (sodium), sugar, saturated fat, and trans fat.  · Limit alcohol intake to no more than 1 drink a day for nonpregnant women and no more than 2 drinks a day for men. One drink equals 12 oz of beer, 5 oz of wine, or 1½ oz of hard liquor.  Lifestyle  · Manage your response to stress. Try to do this with relaxation exercises, mindfulness, yoga, quiet time, or meditation.  · Think about taking part in a stress management program. Ask your health care provider for recommendations.  · Do not use any products that contain nicotine or tobacco, such as cigarettes and e-cigarettes. If you need help quitting, ask your health care provider.  · Maintain a healthy weight. Ask your health care provider what a healthy weight is for you.  · Weigh yourself daily. This will alert you and your health care provider if you have sudden fluid buildup in your body.  General instructions  · Exercise regularly. Aim for 150 minutes of moderate exercise (such as walking) or 75 minutes of vigorous exercise (such as running) each week. Ask your health care provider what exercises are safe for you. This may include an exercise program called cardiac rehabilitation.  · Keep all follow-up visits as told by your health care provider. This is important.  Contact a health care provider if:  · You have any side effects from your medicines, such as depression or fast or irregular heartbeats (palpitations).  · You develop new symptoms, such as:  ? Shortness of breath after exercising.  ? Increased fatigue.  ? Swelling of your ankles, feet, legs, or abdomen.  ? Nausea or a lack of appetite.  ? Rapid heartbeat.  · You have sudden weight gain.  Get help right away if:  · You have sudden, severe chest pain.  · You have shortness of breath that does not go away.  · You faint.  Summary  · Stress-induced cardiomyopathy is caused by extreme  stress brought on by anger, surprise, or grief. This disease is also known as broken heart syndrome.  · People often mistake this condition for a heart attack because the most common symptom is chest pain (angina).  · With the right medical treatment, most people with this condition make a full recovery.  · You are more likely to develop this condition if you are a woman who has gone through menopause.  This information is not intended to replace advice given to you by your health care provider. Make sure you discuss any questions you have with your health care provider.  Document Released: 04/20/2018 Document Revised: 11/30/2018 Document Reviewed: 04/20/2018  Elsevier Patient Education © 2020 [employer]

## 2020-10-29 NOTE — DISCHARGE SUMMARY
"Discharge Summary    CHIEF COMPLAINT ON ADMISSION  Chief Complaint   Patient presents with   • Sent by MD     Pt sent by MD due to \"inconclusive\" EKG   • Nausea     Having intermittent episodes of nausea since this morning   • Chest Pain     Substernal CP that started this morning, pt describes it as a \"heavy pressure\" or a \"scared feeling\"   • Arm Pain     PT reports after CP, R arm shapr pain and numbness from R arm to R shoulder       Reason for Admission  Sent By Urgent Care      Admission Date  10/27/2020    CODE STATUS  Prior    HPI & HOSPITAL COURSE  This is a 60 y.o. female here with chest pain.    10/28/2020: Patient with findings consistent with NSTEMI currently undergoing left heart catheterization with possible intervention.      10/29/2020: patient with no significant coronary artery disease. Current cardiology recommendations are to continue lisinopril and metoprolol. Echo consistent with stress induced cardiomyopathy. Repeat echo in three months with contrast.    Therefore, she is discharged in good and stable condition to home with close outpatient follow-up.    The patient met 2-midnight criteria for an inpatient stay at the time of discharge.    Discharge Date  10/29/2020    FOLLOW UP ITEMS POST DISCHARGE  Repeat echo with contrast in three months.    DISCHARGE DIAGNOSES  Principal Problem (Resolved):    Stress-induced cardiomyopathy POA: Yes  Active Problems:    Major depressive disorder POA: Yes    Dyslipidemia POA: Yes    Anxiety POA: Unknown      FOLLOW UP  Future Appointments   Date Time Provider Department Center   11/9/2020  3:40 PM MICHAEL Sotomayor [facility] None     Ilda Yi D.O.  [address] [facility] [address]  [facility] [id number] [phone]    In 1 week      Yogesh Wilde M.D.  [address] [facility] [address]  [place] [id number] [phone]    In 1 week        MEDICATIONS ON DISCHARGE     Medication List      START taking these medications      Instructions   lisinopril 10 MG Tabs  Start " taking on: October 30, 2020  Commonly known as: PRINIVIL   Take 1 Tab by mouth every day for 30 days.  Dose: 10 mg     metoprolol SR 50 MG Tb24  Start taking on: October 30, 2020  Commonly known as: TOPROL XL   Take 1 Tab by mouth every day for 30 days.  Dose: 50 mg        CONTINUE taking these medications      Instructions   atorvastatin 40 MG Tabs  Commonly known as: LIPITOR   Take 40 mg by mouth every day.  Dose: 40 mg     buPROPion  MG Tb12 sustained-release tablet  Commonly known as: WELLBUTRIN-SR   Take 150 mg by mouth every day.  Dose: 150 mg     citalopram 20 MG Tabs  Commonly known as: CeleXA   Take 20 mg by mouth every day.  Dose: 20 mg     omeprazole 20 MG delayed-release capsule  Commonly known as: PRILOSEC   Take 20 mg by mouth every day.  Dose: 20 mg     vitamin D (Ergocalciferol) 1.25 MG (90574 UT) Caps capsule  Commonly known as: DRISDOL   Take 50,000 Units by mouth every Monday.  Dose: 50,000 Units            Allergies  No Known Allergies    DIET  No orders of the defined types were placed in this encounter.      ACTIVITY  As tolerated.  Weight bearing as tolerated    CONSULTATIONS  Cardiology     PROCEDURES  LHC:    Angiographically normal appearing coronary arteries.  Stress cardiomyopathy    ECHO:    No prior study is available for comparison.   Normal left ventricular systolic function. Left ventricular ejection   fraction is visually estimated to be 65%.  Normal diastolic function.  Estimated right ventricular systolic pressure  is 46 mmHg.  Normal inferior vena cava size and inspiratory collapse.      LABORATORY  Lab Results   Component Value Date    SODIUM 138 10/29/2020    POTASSIUM 4.1 10/29/2020    CHLORIDE 106 10/29/2020    CO2 24 10/29/2020    GLUCOSE 101 (H) 10/29/2020    BUN 12 10/29/2020    CREATININE 0.62 10/29/2020        Lab Results   Component Value Date    WBC 7.8 10/29/2020    HEMOGLOBIN 11.6 (L) 10/29/2020    HEMATOCRIT 37.5 10/29/2020    PLATELETCT 291 10/29/2020         Total time of the discharge process exceeds 35 minutes.

## 2020-11-02 ENCOUNTER — TELEPHONE (OUTPATIENT)
Dept: CARDIOLOGY | Facility: MEDICAL CENTER | Age: 60
End: 2020-11-02

## 2020-11-02 NOTE — TELEPHONE ENCOUNTER
Chart Prep      Spoke to patient checking to see if she has ever seen a cardiologist in the past. She confirms she has not.     Confirmed appt date and time for 11/9/2020 at 3:40PM with RT.    Patient has questions/concerns about medications and diet. Patient states the last DrTam She saw in the hospital told her she should be taking 81MG ASA but she was not given a script for it at discharge. Wants to confirm if she should take this med or not. Also, pharmacist told patient that she cannot eat bananas due to her potassium level. Concerned about this, wants to speak with RT's Nurse.  To Saumya, Please advise.

## 2020-11-02 NOTE — TELEPHONE ENCOUNTER
Spoke with pt. DC summary and instructions given to her do not mention ASA 81mg QD, but pt. Said Dr. Wilde told her to take it when he saw her after her LHC (which showed no coronary artery disease). Pt. Actually recorded Dr. Wilde's discussion on her cell phone.   To Dr. HOWARD (who did consult) re: taking ASA 81mg.     Pt's. Last K=4.1. DC summary does note new added  RX's: Lisinopril 10mg QD and Toprol 50mg QD.   Pt. Was advised she can eat bananas and avocados and her usual diet.

## 2020-11-02 NOTE — TELEPHONE ENCOUNTER
Called pt. To advise.     Message  Received: Today  Message Contents   MERLY Elder L.P.NTam   Caller: Unspecified (Today,  8:45 AM)               Could buy baby AA OTC   If want prescription, chelsea send her one

## 2020-11-09 ENCOUNTER — OFFICE VISIT (OUTPATIENT)
Dept: CARDIOLOGY | Facility: MEDICAL CENTER | Age: 60
End: 2020-11-09
Payer: COMMERCIAL

## 2020-11-09 VITALS
OXYGEN SATURATION: 92 % | WEIGHT: 168 LBS | BODY MASS INDEX: 30.91 KG/M2 | HEART RATE: 72 BPM | DIASTOLIC BLOOD PRESSURE: 74 MMHG | SYSTOLIC BLOOD PRESSURE: 118 MMHG | HEIGHT: 62 IN

## 2020-11-09 DIAGNOSIS — I51.81 STRESS-INDUCED CARDIOMYOPATHY: ICD-10-CM

## 2020-11-09 DIAGNOSIS — E78.5 DYSLIPIDEMIA: ICD-10-CM

## 2020-11-09 PROCEDURE — 99214 OFFICE O/P EST MOD 30 MIN: CPT | Performed by: NURSE PRACTITIONER

## 2020-11-09 ASSESSMENT — ENCOUNTER SYMPTOMS
NERVOUS/ANXIOUS: 0
BLURRED VISION: 0
WEAKNESS: 0
PALPITATIONS: 0
SHORTNESS OF BREATH: 0
FALLS: 0
ORTHOPNEA: 0
WHEEZING: 0
COUGH: 0
DIZZINESS: 0
HEADACHES: 0
LOSS OF CONSCIOUSNESS: 0
FOCAL WEAKNESS: 0
DOUBLE VISION: 0

## 2020-11-09 ASSESSMENT — FIBROSIS 4 INDEX: FIB4 SCORE: 3.66

## 2020-11-09 NOTE — PROGRESS NOTES
"Chief Complaint   Patient presents with   • Cardiomyopathy (Ischemic)     Hospital Follow Up       Subjective:   Rachel Reich is a 60 y.o. female who presents today for hospital follow up s/p NSTEMI.    She was seen by Dr. Wilde in the hospital. Went to hospital for chest pian on 10/29/2020, EKG suggested septal infarct and trop trending up 824. Coronary angiogram showed non-obstructive CAD. Stress induced cardiomyopathy, LV gram showed ef 45% with apical ballooning. Echo showed ef 65%. She was placed on GDMT.        Past Medical History:   Diagnosis Date   • Allergy    • Anesthesia     \"takes a long time to wake up\"   • Anxiety    • Arthritis    • Cataract     bilat IOL   • Depression    • Depression    • GERD (gastroesophageal reflux disease)    • High cholesterol    • Melanoma (HCC)     right neck area      Past Surgical History:   Procedure Laterality Date   • PB WRIST ARTHROSCOP,RELEASE XVERS LIG Right 5/1/2019    Procedure: RELEASE, CARPAL TUNNEL, ENDOSCOPIC;  Surgeon: Winston Alvarez M.D.;  Location: SURGERY SAME DAY [facility];  Service: Orthopedics   • TENDON REPAIR Left 03/2019   • KNEE ARTHROSCOPY  2012   • ABDOMINAL HYSTERECTOMY TOTAL     • CATARACT EXTRACTION WITH IOL Bilateral    • GYN SURGERY      C Section    • HERNIA REPAIR     • PRIMARY C SECTION       History reviewed. No pertinent family history.  Social History     Socioeconomic History   • Marital status: Single     Spouse name: Not on file   • Number of children: Not on file   • Years of education: Not on file   • Highest education level: Not on file   Occupational History   • Not on file   Social Needs   • Financial resource strain: Not on file   • Food insecurity     Worry: Not on file     Inability: Not on file   • Transportation needs     Medical: Not on file     Non-medical: Not on file   Tobacco Use   • Smoking status: Never Smoker   • Smokeless tobacco: Never Used   Substance and Sexual Activity   • Alcohol use: No   • Drug " use: No   • Sexual activity: Not on file   Lifestyle   • Physical activity     Days per week: Not on file     Minutes per session: Not on file   • Stress: Not on file   Relationships   • Social connections     Talks on phone: Not on file     Gets together: Not on file     Attends Faith service: Not on file     Active member of club or organization: Not on file     Attends meetings of clubs or organizations: Not on file     Relationship status: Not on file   • Intimate partner violence     Fear of current or ex partner: Not on file     Emotionally abused: Not on file     Physically abused: Not on file     Forced sexual activity: Not on file   Other Topics Concern   • Not on file   Social History Narrative   • Not on file     No Known Allergies  Outpatient Encounter Medications as of 11/9/2020   Medication Sig Dispense Refill   • aspirin EC (ECOTRIN) 81 MG Tablet Delayed Response Take 1 Tab by mouth every day. 30 Tab 0   • lisinopril (PRINIVIL) 10 MG Tab Take 1 Tab by mouth every day for 30 days. 30 Tab 0   • metoprolol SR (TOPROL XL) 50 MG TABLET SR 24 HR Take 1 Tab by mouth every day for 30 days. 30 Tab 0   • atorvastatin (LIPITOR) 40 MG Tab Take 40 mg by mouth every day.     • citalopram (CELEXA) 20 MG Tab Take 20 mg by mouth every day.     • omeprazole (PRILOSEC) 20 MG delayed-release capsule Take 20 mg by mouth every day.     • vitamin D, Ergocalciferol, (DRISDOL) 57485 units Cap capsule Take 50,000 Units by mouth every Monday.     • buPROPion SR (WELLBUTRIN-SR) 150 MG TB12 Take 150 mg by mouth every day.       No facility-administered encounter medications on file as of 11/9/2020.      Review of Systems   Constitutional: Negative for malaise/fatigue.   Eyes: Negative for blurred vision and double vision.   Respiratory: Negative for cough, shortness of breath and wheezing.    Cardiovascular: Negative for chest pain, palpitations, orthopnea and leg swelling.   Musculoskeletal: Negative for falls.  "  Neurological: Negative for dizziness, focal weakness, loss of consciousness, weakness and headaches.   Psychiatric/Behavioral: The patient is not nervous/anxious.    All other systems reviewed and are negative.       Objective:   /74 (BP Location: Left arm, Patient Position: Sitting, BP Cuff Size: Adult)   Pulse 72   Ht 1.575 m (5' 2\")   Wt 76.2 kg (168 lb)   SpO2 92%   BMI 30.73 kg/m²     Physical Exam   Constitutional: She is oriented to person, place, and time. She appears well-developed and well-nourished. No distress.   HENT:   Head: Normocephalic and atraumatic.   Eyes: Pupils are equal, round, and reactive to light.   Neck: No JVD present.   Cardiovascular: Normal rate, regular rhythm and normal heart sounds.   No murmur heard.  Pulmonary/Chest: Effort normal and breath sounds normal.   Abdominal: Soft. Bowel sounds are normal. She exhibits no distension.   Musculoskeletal:         General: No edema.   Neurological: She is alert and oriented to person, place, and time.   Skin: Skin is warm and dry. She is not diaphoretic.   Psychiatric: She has a normal mood and affect. Her behavior is normal. Judgment and thought content normal.       Cardiac Catheterization:    10/28/2020  Angiographically normal appearing coronary arteries.  Stress cardiomyopathy  Findings:  1.  Left main coronary artery:  Normal.  2.  Left anterior descending artery:  Normal. LAD gives one small diagonal branch, which is normal.  3.  Left circumflex coronary artery:  Normal. Gives two marginal branches, which have no significant disease   4.  Right coronary artery:  Normal.  This is a right dominant system.  5.  Left ventricular end diastolic pressure:  28 mmHg.  No signficant gradient across the aortic valve.  6.  Left ventriculogram:  Ejection fraction of 45%, apical ballooning, stress cardiomyopathy pattern.    ECHO  10/28/2020  No prior study is available for comparison.   Normal left ventricular systolic function. Left " ventricular ejection   fraction is visually estimated to be 65%.  Normal diastolic function.  Estimated right ventricular systolic pressure  is 46 mmHg.  Normal inferior vena cava size and inspiratory collapse.    Assessment:     1. Stress-induced cardiomyopathy  EC-ECHOCARDIOGRAM COMPLETE W/ CONT    CANCELED: EC-ECHOCARDIOGRAM COMPLETE W/O CONT   2. Dyslipidemia         Medical Decision Making:  Today's Assessment / Status / Plan:       1. Stress induced cardiomyopathy:  - Today, based on physical examination findings, patient is euvolemic. No JVD, lungs are clear to auscultation, no pitting edema in bilateral lower extremities, no ascites.   - continue asa due to apical ballooning noted on LV gram.  - continue lisinopril 10mg qd and metoprolol XL 50mg qd     2. Dyslipidemia:  - continue atorvastatin 40mg qd     Repeat ECHO with contrast in three months for evaluation of LVEF.

## 2020-11-20 ENCOUNTER — TELEPHONE (OUTPATIENT)
Dept: CARDIOLOGY | Facility: MEDICAL CENTER | Age: 60
End: 2020-11-20

## 2020-11-20 RX ORDER — METOPROLOL SUCCINATE 50 MG/1
50 TABLET, EXTENDED RELEASE ORAL DAILY
Qty: 90 TAB | Refills: 3 | Status: SHIPPED | OUTPATIENT
Start: 2020-11-20 | End: 2021-09-16

## 2020-11-20 RX ORDER — LISINOPRIL 10 MG/1
10 TABLET ORAL DAILY
Qty: 90 TAB | Refills: 3 | Status: SHIPPED | OUTPATIENT
Start: 2020-11-20 | End: 2021-11-03

## 2020-11-20 NOTE — TELEPHONE ENCOUNTER
RT    Patient called to see lisinopril (PRINIVIL) 10 MG Tab and metoprolol SR (TOPROL XL) 50 MG TABLET SR 24 HR. Please call the Pt back at [phone].     Thank you,  Aggie TORRES

## 2020-12-22 ENCOUNTER — TELEPHONE (OUTPATIENT)
Dept: CARDIOLOGY | Facility: MEDICAL CENTER | Age: 60
End: 2020-12-22

## 2020-12-23 NOTE — TELEPHONE ENCOUNTER
Spoke with patient, and she is suffering from severe sciatic pain and was wondering what OTC pain medication she can take. Advised to try and avoid NSAIDs due to increase risk of bleeding. Advised her to try stretching, heat/ice, and Tylenol for pain manage. Patient receptive to pain management strategies.

## 2020-12-23 NOTE — TELEPHONE ENCOUNTER
TO: 1230pm/Wed/Ofc  NM: Rachel Reich   PH: [phone]   PT NM: Tiny Reich   : 1960   REG DR: Gaetano   RE: Called 2020 and wanted to  know what OTC pain reliever she can  take with her type of meds she is  taking?   DISP HIST: 2020 12:03P RK, pt  dsp

## 2020-12-23 NOTE — TELEPHONE ENCOUNTER
TO: 430p/Tues/Ofc  NM: Rachel Reich   PH: [phone]   PT NM: Rachel Reich   : 1960   REG DR: not sure    RE: Wants to know if she can take any  OTC medication with the meds she is  already taking.   DISP HIST: 2020 04:11P RK, pt  dsp

## 2021-01-14 ENCOUNTER — TELEPHONE (OUTPATIENT)
Dept: CARDIOLOGY | Facility: MEDICAL CENTER | Age: 61
End: 2021-01-14

## 2021-02-05 ENCOUNTER — NURSE TRIAGE (OUTPATIENT)
Dept: HEALTH INFORMATION MANAGEMENT | Facility: OTHER | Age: 61
End: 2021-02-05

## 2021-02-05 NOTE — TELEPHONE ENCOUNTER
Pt having back pain causing pain in leg, body aches last week, and fatigue. Pt getting covid testing done tomorrow to rule out this as a diagnosis. Pt had stress induced cardiomyopathy and is making sure that she is not having another cardiac related episode. No other symptoms but the ones listed above. Fatigue was the symptom that she went into the ED for and got diagnosed with the heart attack. Fatigue is not as bad as it was before. Pt also has appointment next week for heart testing. Pt taking BP medication for her heart. Advised monitoring BP before taking BP medications. Pt to call back if symptoms worsen.    Reason for Disposition  • [1] COVID-19 infection diagnosed or suspected AND [2] mild symptoms (fever, cough) AND [3] no trouble breathing or other complications    Additional Information  • Negative: SEVERE difficulty breathing (e.g., struggling for each breath, speaks in single words)  • Negative: Difficult to awaken or acting confused (e.g., disoriented, slurred speech)  • Negative: Bluish (or gray) lips or face now  • Negative: Shock suspected (e.g., cold/pale/clammy skin, too weak to stand, low BP, rapid pulse)  • Negative: Sounds like a life-threatening emergency to the triager  • Negative: [1] COVID-19 suspected (e.g., cough, fever, shortness of breath) AND [2] public health department recommends testing  • Negative: [1] COVID-19 exposure AND [2] no symptoms  • Negative: COVID-19 and Breastfeeding, questions about  • Negative: SEVERE or constant chest pain (Exception: mild central chest pain, present only when coughing)  • Negative: MODERATE difficulty breathing (e.g., speaks in phrases, SOB even at rest, pulse 100-120)  • Negative: MILD difficulty breathing (e.g., minimal/no SOB at rest, SOB with walking, pulse <100)  • Negative: Chest pain  • Negative: Patient sounds very sick or weak to the triager  • Negative: Fever > 103 F (39.4 C)  • Negative: [1] Fever > 101 F (38.3 C) AND [2] age > 60  •  "Negative: [1] Fever > 100.0 F (37.8 C) AND [2] bedridden (e.g., nursing home patient, CVA, chronic illness, recovering from surgery)  • Negative: HIGH RISK patient (e.g., age > 64 years, diabetes, heart or lung disease, weak immune system)  • Negative: Fever present > 3 days (72 hours)  • Negative: [1] Fever returns after gone for over 24 hours AND [2] symptoms worse or not improved  • Negative: [1] Continuous (nonstop) coughing interferes with work or school AND [2] no improvement using cough treatment per protocol  • Negative: Cough present > 3 weeks    Answer Assessment - Initial Assessment Questions  1. COVID-19 DIAGNOSIS: \"Who made your Coronavirus (COVID-19) diagnosis?\" \"Was it confirmed by a positive lab test?\" If not diagnosed by a HCP, ask \"Are there lots of cases (community spread) where you live?\" (See public health department website, if unsure)    * MAJOR community spread: high number of cases; numbers of cases are increasing; many people hospitalized.    * MINOR community spread: low number of cases; not increasing; few or no people hospitalized      MAJOR  2. ONSET: \"When did the COVID-19 symptoms start?\"       One week  3. WORST SYMPTOM: \"What is your worst symptom?\" (e.g., cough, fever, shortness of breath, muscle aches)      fatigue  4. COUGH: \"How bad is the cough?\"        no  5. FEVER: \"Do you have a fever?\" If so, ask: \"What is your temperature, how was it measured, and when did it start?\"      no  6. RESPIRATORY STATUS: \"Describe your breathing?\" (e.g., shortness of breath, wheezing, unable to speak)       normal  7. BETTER-SAME-WORSE: \"Are you getting better, staying the same or getting worse compared to yesterday?\"  If getting worse, ask, \"In what way?\"      Worse with fatigue  8. HIGH RISK DISEASE: \"Do you have any chronic medical problems?\" (e.g., asthma, heart or lung disease, weak immune system, etc.)      Recently in hospital for stress induced cardiomyopathy  9. PREGNANCY: \"Is there any " "chance you are pregnant?\" \"When was your last menstrual period?\"      NA  10. OTHER SYMPTOMS: \"Do you have any other symptoms?\"  (e.g., runny nose, headache, sore throat, loss of smell)        Body aches and fatigue    Protocols used: CORONAVIRUS (COVID-19) DIAGNOSED OR [id number]      "

## 2021-02-12 ENCOUNTER — HOSPITAL ENCOUNTER (OUTPATIENT)
Dept: CARDIOLOGY | Facility: MEDICAL CENTER | Age: 61
End: 2021-02-12
Attending: NURSE PRACTITIONER
Payer: COMMERCIAL

## 2021-02-12 DIAGNOSIS — I51.81 STRESS-INDUCED CARDIOMYOPATHY: ICD-10-CM

## 2021-02-12 PROCEDURE — 93306 TTE W/DOPPLER COMPLETE: CPT

## 2021-02-13 LAB
LV EJECT FRACT  99904: 65
LV EJECT FRACT MOD 2C 99903: 71.2
LV EJECT FRACT MOD 4C 99902: 72.03
LV EJECT FRACT MOD BP 99901: 71.97

## 2021-02-13 PROCEDURE — 93306 TTE W/DOPPLER COMPLETE: CPT | Mod: 26 | Performed by: INTERNAL MEDICINE

## 2021-02-18 ENCOUNTER — TELEMEDICINE (OUTPATIENT)
Dept: CARDIOLOGY | Facility: MEDICAL CENTER | Age: 61
End: 2021-02-18
Payer: COMMERCIAL

## 2021-02-18 VITALS — BODY MASS INDEX: 30.18 KG/M2 | WEIGHT: 164 LBS | HEIGHT: 62 IN

## 2021-02-18 DIAGNOSIS — I51.81 STRESS-INDUCED CARDIOMYOPATHY: ICD-10-CM

## 2021-02-18 DIAGNOSIS — Z01.810 PREOPERATIVE CARDIOVASCULAR EXAMINATION: ICD-10-CM

## 2021-02-18 PROCEDURE — 99214 OFFICE O/P EST MOD 30 MIN: CPT | Mod: 95,CR | Performed by: NURSE PRACTITIONER

## 2021-02-18 ASSESSMENT — FIBROSIS 4 INDEX: FIB4 SCORE: 3.66

## 2021-02-18 NOTE — PROGRESS NOTES
Virtual Visit: Established Patient   This visit was conducted via Zoom using secure and encrypted videoconferencing technology. The patient was in a private location in the state of [place].    The patient's identity was confirmed and verbal consent was obtained for this virtual visit.    Subjective:   CC:   Chief Complaint   Patient presents with   • Dyslipidemia       Rachel Reich is a 60 y.o. female presenting for evaluation and management of: CAD.     She was seen by Dr. Wilde in the hospital. Went to hospital for chest pian on 10/29/2020, EKG suggested septal infarct and trop trending up 824. Coronary angiogram showed non-obstructive CAD. Stress induced cardiomyopathy, LV gram showed ef 45% with apical ballooning. Echo showed ef 65%. She was placed on GDMT.    She is planning on getting back surgery with Dr. Ochoa at [facility]. Denies any chest pain, sob, dizziness or palpitations.      ROS   Denies any recent fevers or chills. No nausea or vomiting. No chest pains or shortness of breath.     No Known Allergies    Current medicines (including changes today)  Current Outpatient Medications   Medication Sig Dispense Refill   • lisinopril (PRINIVIL) 10 MG Tab Take 1 Tab by mouth every day. 90 Tab 3   • metoprolol SR (TOPROL XL) 50 MG TABLET SR 24 HR Take 1 Tab by mouth every day. 90 Tab 3   • aspirin EC (ECOTRIN) 81 MG Tablet Delayed Response Take 1 Tab by mouth every day. 30 Tab 0   • atorvastatin (LIPITOR) 40 MG Tab Take 40 mg by mouth every day.     • citalopram (CELEXA) 20 MG Tab Take 20 mg by mouth every day.     • omeprazole (PRILOSEC) 20 MG delayed-release capsule Take 20 mg by mouth every day.     • vitamin D, Ergocalciferol, (DRISDOL) 63884 units Cap capsule Take 50,000 Units by mouth every Monday.     • buPROPion SR (WELLBUTRIN-SR) 150 MG TB12 Take 150 mg by mouth every day.       No current facility-administered medications for this visit.       Patient Active Problem List    Diagnosis Date  "Noted   • Major depressive disorder 10/27/2020     Priority: Medium   • Dyslipidemia 10/27/2020     Priority: Medium   • Stress-induced cardiomyopathy 11/09/2020   • Anxiety 10/27/2020       History reviewed. No pertinent family history.    She  has a past medical history of Allergy, Anesthesia, Anxiety, Arthritis, Cataract, Depression, Depression, GERD (gastroesophageal reflux disease), High cholesterol, and Melanoma (HCC).  She  has a past surgical history that includes hernia repair; primary c section; abdominal hysterectomy total; gyn surgery; tendon repair (Left, 03/2019); knee arthroscopy (2012); cataract extraction with iol (Bilateral); and pr wrist arthroscop,release xvers lig (Right, 5/1/2019).       Objective:   Ht 1.575 m (5' 2\")   Wt 74.4 kg (164 lb)   BMI 30.00 kg/m²     Physical Exam:  Constitutional: Alert, no distress, well-groomed.  Skin: No rashes in visible areas.  Eye: Round. Conjunctiva clear, lids normal. No icterus.   ENMT: Lips pink without lesions, good dentition, moist mucous membranes. Phonation normal.  Neck: No masses, no thyromegaly. Moves freely without pain.  Respiratory: Unlabored respiratory effort, no cough or audible wheeze  Psych: Alert and oriented x3, normal affect and mood.       Cardiac Catheterization:    10/28/2020  Angiographically normal appearing coronary arteries.  Stress cardiomyopathy  Findings:  1.  Left main coronary artery:  Normal.  2.  Left anterior descending artery:  Normal. LAD gives one small diagonal branch, which is normal.  3.  Left circumflex coronary artery:  Normal. Gives two marginal branches, which have no significant disease   4.  Right coronary artery:  Normal.  This is a right dominant system.  5.  Left ventricular end diastolic pressure:  28 mmHg.  No signficant gradient across the aortic valve.  6.  Left ventriculogram:  Ejection fraction of 45%, apical ballooning, stress cardiomyopathy pattern.     ECHO  10/28/2020  No prior study is available " for comparison.   Normal left ventricular systolic function. Left ventricular ejection   fraction is visually estimated to be 65%.  Normal diastolic function.  Estimated right ventricular systolic pressure  is 46 mmHg.  Normal inferior vena cava size and inspiratory collapse.    Assessment and Plan:   The following treatment plan was discussed:     There are no diagnoses linked to this encounter.    1. Stress induced cardiomyopathy:  - Today, based on physical examination findings, patient is euvolemic. No JVD, lungs are clear to auscultation, no pitting edema in bilateral lower extremities, no ascites.   - continue asa due to apical ballooning noted on LV gram.  - continue lisinopril 10mg qd and metoprolol XL 50mg qd      2. Dyslipidemia:  - continue atorvastatin 40mg qd     3. Preoperative cardiovascular examination:  - RCRI 1  - patient is low-moderate risk for intended surgery. She can stop the asa 5-7 days prior to surgery. Recommend continuing lisinopril and metoprolol 50mg XL.       Follow-up: Return in about 7 months (around 9/18/2021).

## 2021-03-15 DIAGNOSIS — Z23 NEED FOR VACCINATION: ICD-10-CM

## 2021-03-17 ENCOUNTER — IMMUNIZATION (OUTPATIENT)
Dept: FAMILY PLANNING/WOMEN'S HEALTH CLINIC | Facility: IMMUNIZATION CENTER | Age: 61
End: 2021-03-17
Attending: INTERNAL MEDICINE
Payer: COMMERCIAL

## 2021-03-17 DIAGNOSIS — Z23 NEED FOR VACCINATION: ICD-10-CM

## 2021-03-17 DIAGNOSIS — Z23 ENCOUNTER FOR VACCINATION: Primary | ICD-10-CM

## 2021-03-17 PROCEDURE — 0001A PFIZER SARS-COV-2 VACCINE: CPT

## 2021-03-17 PROCEDURE — 91300 PFIZER SARS-COV-2 VACCINE: CPT

## 2021-04-16 ENCOUNTER — HOSPITAL ENCOUNTER (OUTPATIENT)
Dept: RADIOLOGY | Facility: MEDICAL CENTER | Age: 61
End: 2021-04-16
Attending: PHYSICIAN ASSISTANT
Payer: COMMERCIAL

## 2021-04-16 DIAGNOSIS — M48.062 SPINAL STENOSIS, LUMBAR REGION, WITH NEUROGENIC CLAUDICATION: ICD-10-CM

## 2021-04-16 PROCEDURE — 72148 MRI LUMBAR SPINE W/O DYE: CPT

## 2021-04-29 ENCOUNTER — IMMUNIZATION (OUTPATIENT)
Dept: FAMILY PLANNING/WOMEN'S HEALTH CLINIC | Facility: IMMUNIZATION CENTER | Age: 61
End: 2021-04-29
Attending: INTERNAL MEDICINE
Payer: COMMERCIAL

## 2021-04-29 DIAGNOSIS — Z23 ENCOUNTER FOR VACCINATION: Primary | ICD-10-CM

## 2021-04-29 PROCEDURE — 91300 PFIZER SARS-COV-2 VACCINE: CPT

## 2021-04-29 PROCEDURE — 0002A PFIZER SARS-COV-2 VACCINE: CPT

## 2021-07-30 ENCOUNTER — HOSPITAL ENCOUNTER (OUTPATIENT)
Dept: RADIOLOGY | Facility: MEDICAL CENTER | Age: 61
End: 2021-07-30
Attending: PHYSICIAN ASSISTANT
Payer: COMMERCIAL

## 2021-07-30 DIAGNOSIS — M54.16 RADICULOPATHY, LUMBAR REGION: ICD-10-CM

## 2021-07-30 PROCEDURE — 72110 X-RAY EXAM L-2 SPINE 4/>VWS: CPT

## 2021-09-16 ENCOUNTER — OFFICE VISIT (OUTPATIENT)
Dept: CARDIOLOGY | Facility: MEDICAL CENTER | Age: 61
End: 2021-09-16
Payer: COMMERCIAL

## 2021-09-16 VITALS
HEIGHT: 62 IN | SYSTOLIC BLOOD PRESSURE: 120 MMHG | HEART RATE: 64 BPM | DIASTOLIC BLOOD PRESSURE: 70 MMHG | RESPIRATION RATE: 14 BRPM | OXYGEN SATURATION: 96 % | WEIGHT: 173.1 LBS | BODY MASS INDEX: 31.86 KG/M2

## 2021-09-16 DIAGNOSIS — E78.5 DYSLIPIDEMIA: ICD-10-CM

## 2021-09-16 DIAGNOSIS — I10 ESSENTIAL HYPERTENSION, BENIGN: ICD-10-CM

## 2021-09-16 DIAGNOSIS — I51.81 STRESS-INDUCED CARDIOMYOPATHY: ICD-10-CM

## 2021-09-16 PROCEDURE — 99214 OFFICE O/P EST MOD 30 MIN: CPT | Performed by: INTERNAL MEDICINE

## 2021-09-16 RX ORDER — METOPROLOL SUCCINATE 25 MG/1
25 TABLET, EXTENDED RELEASE ORAL DAILY
Qty: 90 TABLET | Refills: 3 | Status: SHIPPED | OUTPATIENT
Start: 2021-09-16

## 2021-09-16 RX ORDER — GABAPENTIN 600 MG/1
600 TABLET ORAL 2 TIMES DAILY
COMMUNITY

## 2021-09-16 ASSESSMENT — ENCOUNTER SYMPTOMS
DIZZINESS: 0
COUGH: 0
VOMITING: 0
BACK PAIN: 1
GASTROINTESTINAL NEGATIVE: 1
MYALGIAS: 0
DOUBLE VISION: 0
WEAKNESS: 0
CARDIOVASCULAR NEGATIVE: 1
ABDOMINAL PAIN: 0
RESPIRATORY NEGATIVE: 1
FEVER: 0
SHORTNESS OF BREATH: 0
CHILLS: 0
FOCAL WEAKNESS: 0
PSYCHIATRIC NEGATIVE: 1
HEADACHES: 0
PALPITATIONS: 0
WEIGHT LOSS: 0
NAUSEA: 0
BLURRED VISION: 0
BRUISES/BLEEDS EASILY: 0
NERVOUS/ANXIOUS: 0
CLAUDICATION: 0
EYES NEGATIVE: 1
DEPRESSION: 0
NEUROLOGICAL NEGATIVE: 1

## 2021-09-16 ASSESSMENT — FIBROSIS 4 INDEX: FIB4 SCORE: 3.66

## 2021-09-16 NOTE — PROGRESS NOTES
"Chief Complaint   Patient presents with   • Dyslipidemia   • Cardiomyopathy (Non-ischemic)     F/V Dx: Stress-induced cardiomyopathy       Subjective     Rachel Reich is a 60 y.o. female who presents today for follow up of Tako-Tsubo cardiomyopathy.    Since the patient's last visit on 11/09/20 with Linwood Lara, she has been doing well clinically. She admits to mild fatigue. She denies chest pain, shortness of breath, palpitations, nausea/vomiting or diaphoresis. Her activity is limited by back pain.     Past Medical History:   Diagnosis Date   • Allergy    • Anesthesia     \"takes a long time to wake up\"   • Anxiety    • Arthritis    • Cataract     bilat IOL   • Depression    • Depression    • GERD (gastroesophageal reflux disease)    • High cholesterol    • Hypertension    • Melanoma (HCC)     right neck area    • Stress-induced cardiomyopathy      Past Surgical History:   Procedure Laterality Date   • PB WRIST ARTHROSCOP,RELEASE XVERS LIG Right 5/1/2019    Procedure: RELEASE, CARPAL TUNNEL, ENDOSCOPIC;  Surgeon: Winston Alvarez M.D.;  Location: SURGERY SAME DAY [facility];  Service: Orthopedics   • TENDON REPAIR Left 03/2019   • KNEE ARTHROSCOPY  2012   • ABDOMINAL HYSTERECTOMY TOTAL     • CATARACT EXTRACTION WITH IOL Bilateral    • GYN SURGERY      C Section    • HERNIA REPAIR     • PRIMARY C SECTION     • ZZZ CARDIAC CATH       Family History   Problem Relation Age of Onset   • Heart Disease Father      Social History     Socioeconomic History   • Marital status:      Spouse name: Not on file   • Number of children: Not on file   • Years of education: Not on file   • Highest education level: Not on file   Occupational History   • Not on file   Tobacco Use   • Smoking status: Never Smoker   • Smokeless tobacco: Never Used   Vaping Use   • Vaping Use: Never used   Substance and Sexual Activity   • Alcohol use: No   • Drug use: No   • Sexual activity: Not on file   Other Topics " Concern   • Not on file   Social History Narrative   • Not on file     Social Determinants of Health     Financial Resource Strain:    • Difficulty of Paying Living Expenses:    Food Insecurity:    • Worried About Running Out of Food in the Last Year:    • Ran Out of Food in the Last Year:    Transportation Needs:    • Lack of Transportation (Medical):    • Lack of Transportation (Non-Medical):    Physical Activity:    • Days of Exercise per Week:    • Minutes of Exercise per Session:    Stress:    • Feeling of Stress :    Social Connections:    • Frequency of Communication with Friends and Family:    • Frequency of Social Gatherings with Friends and Family:    • Attends [employer] Services:    • Active Member of Clubs or Organizations:    • Attends Club or Organization Meetings:    • Marital Status:    Intimate Partner Violence:    • Fear of Current or Ex-Partner:    • Emotionally Abused:    • Physically Abused:    • Sexually Abused:      No Known Allergies     (Medications reviewed.)  Outpatient Encounter Medications as of 9/16/2021   Medication Sig Dispense Refill   • gabapentin (NEURONTIN) 600 MG tablet Take 600 mg by mouth 2 times a day.     • lisinopril (PRINIVIL) 10 MG Tab Take 1 Tab by mouth every day. 90 Tab 3   • metoprolol SR (TOPROL XL) 50 MG TABLET SR 24 HR Take 1 Tab by mouth every day. 90 Tab 3   • aspirin EC (ECOTRIN) 81 MG Tablet Delayed Response Take 1 Tab by mouth every day. 30 Tab 0   • atorvastatin (LIPITOR) 40 MG Tab Take 40 mg by mouth every day.     • citalopram (CELEXA) 20 MG Tab Take 20 mg by mouth every day.     • omeprazole (PRILOSEC) 20 MG delayed-release capsule Take 20 mg by mouth every day.     • vitamin D, Ergocalciferol, (DRISDOL) 78615 units Cap capsule Take 50,000 Units by mouth every Monday.     • buPROPion SR (WELLBUTRIN-SR) 150 MG TB12 Take 150 mg by mouth every day.       No facility-administered encounter medications on file as of 9/16/2021.     Review of Systems  "  Constitutional: Positive for malaise/fatigue. Negative for chills, fever and weight loss.   HENT: Negative.  Negative for hearing loss.    Eyes: Negative.  Negative for blurred vision and double vision.   Respiratory: Negative.  Negative for cough and shortness of breath.    Cardiovascular: Negative.  Negative for chest pain, palpitations, claudication and leg swelling.   Gastrointestinal: Negative.  Negative for abdominal pain, nausea and vomiting.   Genitourinary: Negative.  Negative for dysuria and urgency.   Musculoskeletal: Positive for back pain. Negative for joint pain and myalgias.   Skin: Negative.  Negative for itching and rash.   Neurological: Negative.  Negative for dizziness, focal weakness, weakness and headaches.   Endo/Heme/Allergies: Negative.  Does not bruise/bleed easily.   Psychiatric/Behavioral: Negative.  Negative for depression. The patient is not nervous/anxious.               Objective     /70 (BP Location: Left arm, Patient Position: Sitting, BP Cuff Size: Adult)   Pulse 64   Resp 14   Ht 1.575 m (5' 2\")   Wt 78.5 kg (173 lb 1.6 oz)   SpO2 96%   BMI 31.66 kg/m²     Physical Exam  Constitutional:       Appearance: She is well-developed.   HENT:      Head: Normocephalic and atraumatic.   Neck:      Vascular: No JVD.   Cardiovascular:      Rate and Rhythm: Normal rate and regular rhythm.      Heart sounds: Normal heart sounds.   Pulmonary:      Effort: Pulmonary effort is normal.      Breath sounds: Normal breath sounds.   Abdominal:      General: Bowel sounds are normal.      Palpations: Abdomen is soft.      Comments: No hepatosplenomegaly.   Musculoskeletal:         General: Normal range of motion.   Lymphadenopathy:      Cervical: No cervical adenopathy.   Skin:     General: Skin is warm and dry.   Neurological:      Mental Status: She is alert and oriented to person, place, and time.            CARDIAC STUDIES/PROCEDURES:    CARDIAC CATHETERIZATION CONCLUSIONS by " Vinny Adair (10/28/20)  Angiographically normal appearing coronary arteries.  Stress cardiomyopathy  (study result reviewed)    ECHOCARDIOGRAM CONCLUSIONS (02/12/21)  Normal left ventricular systolic function.   Left ventricular ejection fraction is visually estimated to be 65%.   Grade II diastolic dysfunction.  Normal inferior vena cava size and inspiratory collapse.  Estimated right ventricular systolic pressure is 39 mmHg  (study result reviewed)    ECHOCARDIOGRAM CONCLUSIONS (10/28/20)  No prior study is available for comparison.   Normal left ventricular systolic function.   Left ventricular ejection fraction is visually estimated to be 65%.  Normal diastolic function.  Estimated right ventricular systolic pressure  is 46 mmHg.  Normal inferior vena cava size and inspiratory collapse.  (study result reviewed)    EKG performed on (10/28/20) was reviewed: EKG personally interpreted shows sinus rhythm with anterior Q waves.    Laboratory results of (10/28/20) were reviewed. Cholesterol profile of 150/213/42/65 mg/dL noted.    Assessment & Plan     1. Stress-induced cardiomyopathy     2. Essential hypertension, benign     3. Dyslipidemia         Medical Decision Making: Today's Assessment/Status/Plan:        1. Tako-Tsubo cardiomyopathy: She is clinically doing well. We will continue with medical therapy. We will discontinue aspirin.  2. Hypertension: Blood pressure is well controlled, however she is suffering with fatigue likely due beta blockade therapy. We will reduce Toprol to 25 mg QD and continue lisinopril.  3. Hyperlipidemia: She is doing well on statin therapy without myalgia symptoms. She will reduce her carbohydrate intake. We will repeat labs including fasting lipid profile in 3 months.  4. Additional information: Her father Marvin Hutton is also a patient of mine.     We will follow up in 3 months.    CC Ilda Tafoya

## 2021-11-02 DIAGNOSIS — I10 ESSENTIAL HYPERTENSION, BENIGN: ICD-10-CM

## 2021-11-03 RX ORDER — LISINOPRIL 10 MG/1
TABLET ORAL
Qty: 90 TABLET | Refills: 3 | Status: SHIPPED | OUTPATIENT
Start: 2021-11-03

## 2021-12-03 DIAGNOSIS — E78.5 DYSLIPIDEMIA: ICD-10-CM

## 2021-12-15 ENCOUNTER — APPOINTMENT (RX ONLY)
Dept: URBAN - METROPOLITAN AREA CLINIC 22 | Facility: CLINIC | Age: 61
Setting detail: DERMATOLOGY
End: 2021-12-15

## 2021-12-15 DIAGNOSIS — L82.1 OTHER SEBORRHEIC KERATOSIS: ICD-10-CM

## 2021-12-15 DIAGNOSIS — L81.4 OTHER MELANIN HYPERPIGMENTATION: ICD-10-CM

## 2021-12-15 DIAGNOSIS — D22 MELANOCYTIC NEVI: ICD-10-CM

## 2021-12-15 DIAGNOSIS — Z85.820 PERSONAL HISTORY OF MALIGNANT MELANOMA OF SKIN: ICD-10-CM

## 2021-12-15 DIAGNOSIS — L85.1 ACQUIRED KERATOSIS [KERATODERMA] PALMARIS ET PLANTARIS: ICD-10-CM

## 2021-12-15 PROBLEM — D22.5 MELANOCYTIC NEVI OF TRUNK: Status: ACTIVE | Noted: 2021-12-15

## 2021-12-15 PROBLEM — D48.5 NEOPLASM OF UNCERTAIN BEHAVIOR OF SKIN: Status: ACTIVE | Noted: 2021-12-15

## 2021-12-15 PROCEDURE — ? PRESCRIPTION

## 2021-12-15 PROCEDURE — ? BIOPSY BY SHAVE METHOD

## 2021-12-15 PROCEDURE — ? POINTED OUT BY PATIENT

## 2021-12-15 PROCEDURE — 99213 OFFICE O/P EST LOW 20 MIN: CPT | Mod: 25

## 2021-12-15 PROCEDURE — ? COUNSELING

## 2021-12-15 PROCEDURE — 11102 TANGNTL BX SKIN SINGLE LES: CPT

## 2021-12-15 PROCEDURE — ? SUNSCREEN TREATMENT REGIMEN

## 2021-12-15 RX ORDER — UREA 40 %
1 CREAM (GRAM) TOPICAL BID
Qty: 28 | Refills: 10 | Status: ERX | COMMUNITY
Start: 2021-12-15

## 2021-12-15 RX ADMIN — Medication 1: at 00:00

## 2021-12-15 ASSESSMENT — LOCATION SIMPLE DESCRIPTION DERM
LOCATION SIMPLE: RIGHT PLANTAR SURFACE
LOCATION SIMPLE: LEFT PLANTAR SURFACE
LOCATION SIMPLE: INFERIOR FOREHEAD
LOCATION SIMPLE: LEFT FOREARM
LOCATION SIMPLE: RIGHT ANTERIOR NECK
LOCATION SIMPLE: UPPER BACK
LOCATION SIMPLE: RIGHT FOREARM

## 2021-12-15 ASSESSMENT — LOCATION ZONE DERM
LOCATION ZONE: FACE
LOCATION ZONE: FEET
LOCATION ZONE: ARM
LOCATION ZONE: NECK
LOCATION ZONE: TRUNK

## 2021-12-15 ASSESSMENT — LOCATION DETAILED DESCRIPTION DERM
LOCATION DETAILED: RIGHT LATERAL PLANTAR HEEL
LOCATION DETAILED: LEFT MEDIAL PLANTAR HEEL
LOCATION DETAILED: LEFT PLANTAR FOREFOOT OVERLYING 3RD METATARSAL
LOCATION DETAILED: RIGHT SUPERIOR ANTERIOR NECK
LOCATION DETAILED: RIGHT VENTRAL PROXIMAL FOREARM
LOCATION DETAILED: INFERIOR MID FOREHEAD
LOCATION DETAILED: RIGHT INFERIOR LATERAL NECK
LOCATION DETAILED: LEFT VENTRAL PROXIMAL FOREARM
LOCATION DETAILED: INFERIOR THORACIC SPINE
LOCATION DETAILED: RIGHT MEDIAL PLANTAR MIDFOOT

## 2021-12-15 NOTE — HPI: FULL BODY SKIN EXAMINATION
How Severe Are Your Spot(S)?: moderate
What Type Of Note Output Would You Prefer (Optional)?: Standard Output
What Is The Reason For Today's Visit?: Full Body Skin Examination
What Is The Reason For Today's Visit? (Being Monitored For X): the development of a new lesion
Additional History: Lesion on upper will get irritated and bleed intermittently. The lesion on the left arm comes and goes and will also bleed. She emailed photos to my work email knowing it would not be encrypted.

## 2021-12-15 NOTE — PROCEDURE: BIOPSY BY SHAVE METHOD
Detail Level: Detailed
Depth Of Biopsy: dermis
Was A Bandage Applied: Yes
Size Of Lesion In Cm: 0.6
X Size Of Lesion In Cm: 0
Biopsy Type: H and E
Biopsy Method: Personna blade
Anesthesia Type: 1% lidocaine with 1:100,000 epinephrine and a 1:10 solution of 8.4% sodium bicarbonate
Anesthesia Volume In Cc: 1
Hemostasis: Aluminum Chloride
Wound Care: Petrolatum
Dressing: pressure dressing with telfa
Destruction After The Procedure: No
Type Of Destruction Used: Curettage
Curettage Text: The wound bed was treated with curettage after the biopsy was performed.
Cryotherapy Text: The wound bed was treated with cryotherapy after the biopsy was performed.
Electrodesiccation Text: The wound bed was treated with electrodesiccation after the biopsy was performed.
Electrodesiccation And Curettage Text: The wound bed was treated with electrodesiccation and curettage after the biopsy was performed.
Silver Nitrate Text: The wound bed was treated with silver nitrate after the biopsy was performed.
Lab: [id number]
Lab Facility: 
Consent: Written consent was obtained and risks were reviewed including but not limited to scarring, infection, bleeding, scabbing, incomplete removal, nerve damage and allergy to anesthesia.
Post-Care Instructions: I reviewed with the patient in detail post-care instructions. Patient is to keep the biopsy site dry overnight. Gentle cleansing daily.  Apply petroleum ointment daily until healed. Patient may apply hydrogen peroxide soaks to remove any crusting.
Notification Instructions: Patient will be notified of biopsy results. However, patient instructed to call the office if not contacted within 2 weeks.
Billing Type: Third-Party Bill
Information: Selecting Yes will display possible errors in your note based on the variables you have selected. This validation is only offered as a suggestion for you. PLEASE NOTE THAT THE VALIDATION TEXT WILL BE REMOVED WHEN YOU FINALIZE YOUR NOTE. IF YOU WANT TO FAX A PRELIMINARY NOTE YOU WILL NEED TO TOGGLE THIS TO 'NO' IF YOU DO NOT WANT IT IN YOUR FAXED NOTE.

## 2022-01-25 ENCOUNTER — APPOINTMENT (RX ONLY)
Dept: URBAN - METROPOLITAN AREA CLINIC 22 | Facility: CLINIC | Age: 62
Setting detail: DERMATOLOGY
End: 2022-01-25

## 2022-01-25 DIAGNOSIS — D485 NEOPLASM OF UNCERTAIN BEHAVIOR OF SKIN: ICD-10-CM

## 2022-01-25 PROBLEM — D48.5 NEOPLASM OF UNCERTAIN BEHAVIOR OF SKIN: Status: ACTIVE | Noted: 2022-01-25

## 2022-01-25 PROCEDURE — 11401 EXC TR-EXT B9+MARG 0.6-1 CM: CPT

## 2022-01-25 PROCEDURE — 12031 INTMD RPR S/A/T/EXT 2.5 CM/<: CPT

## 2022-01-25 PROCEDURE — ? PUNCH EXCISION

## 2022-01-25 ASSESSMENT — LOCATION DETAILED DESCRIPTION DERM: LOCATION DETAILED: LEFT DISTAL POSTERIOR UPPER ARM

## 2022-01-25 ASSESSMENT — LOCATION SIMPLE DESCRIPTION DERM: LOCATION SIMPLE: LEFT POSTERIOR UPPER ARM

## 2022-01-25 ASSESSMENT — LOCATION ZONE DERM: LOCATION ZONE: ARM

## 2022-01-25 NOTE — PROCEDURE: PUNCH EXCISION
Medical Necessity Clause: This procedure was medically necessary because the lesion that was treated was:
Detail Level: Detailed
Size Of Lesion (*Required): 0.6
X Size Of Lesion Width In Cm (Optional): 0
Size Of Margin In Cm: 0.1
Punch Size In Mm: 8
Repair Type: Intermediate
Intermediate / Complex Repair - Final Wound Length In Cm: 0.8
Complex Requirements: Extensive Undermining Performed?: No
Undermining Type: Entire Wound
Debridement Text: The wound edges were debrided prior to proceeding with the closure to facilitate wound healing.
Helical Rim Text: The closure involved the helical rim.
Vermilion Border Text: The closure involved the vermilion border.
Nostril Rim Text: The closure involved the nostril rim.
Retention Suture Text: Retention sutures were placed to support the closure and prevent dehiscence.
Anesthesia Type: 1% lidocaine with epinephrine
Anesthesia Volume In Cc: 3
Hemostasis: Pressure
Deep Sutures: 4-0 Maxon
Number Of Dermal Sutures (Optional): 1
Epidermal Sutures: 4-0 Surgipro
Epidermal Closure: simple interrupted
Wound Care: Petrolatum
Wound Dressings: a pressure dressing
Suture Removal: 14 days
Lab: [id number]
Lab Facility: 
Path Notes (To The Dermatopathologist): Please check margins.
1.5 Mm Punch Excision Text: A 1.5 mm punch biopsy was used to excise the lesion to the level of the subcutaneous fat.  Blunt dissection was used to free the lesion from the surrounding tissues and the lesion was removed.
2 Mm Punch Excision Text: A 2 mm punch biopsy was used to excise the lesion to the level of the subcutaneous fat.  Blunt dissection was used to free the lesion from the surrounding tissues and the lesion was removed.
2.5 Mm Punch Excision Text: A 2.5 mm punch biopsy was used to excise the lesion to the level of the subcutaneous fat.  Blunt dissection was used to free the lesion from the surrounding tissues and the lesion was removed.
3 Mm Punch Excision Text: A 3 mm punch biopsy was used to excise the lesion to the level of the subcutaneous fat.  Blunt dissection was used to free the lesion from the surrounding tissues and the lesion was removed.
3.5 Mm Punch Excision Text: A 3.5 mm punch biopsy was used to excise the lesion to the level of the subcutaneous fat.  Blunt dissection was used to free the lesion from the surrounding tissues and the lesion was removed.
4 Mm Punch Excision Text: A 4 mm punch biopsy was used to excise the lesion to the level of the subcutaneous fat.  Blunt dissection was used to free the lesion from the surrounding tissues and the lesion was removed.
4.5 Mm Punch Excision Text: A 4.5 mm punch biopsy was used to excise the lesion to the level of the subcutaneous fat.  Blunt dissection was used to free the lesion from the surrounding tissues and the lesion was removed.
5 Mm Punch Excision Text: A 5 mm punch biopsy was used to excise the lesion to the level of the subcutaneous fat.  Blunt dissection was used to free the lesion from the surrounding tissues and the lesion was removed.
6 Mm Punch Excision Text: A 6 mm punch biopsy was used to excise the lesion to the level of the subcutaneous fat.  Blunt dissection was used to free the lesion from the surrounding tissues and the lesion was removed.
7 Mm Punch Excision Text: A 7 mm punch biopsy was used to excise the lesion to the level of the subcutaneous fat.  Blunt dissection was used to free the lesion from the surrounding tissues and the lesion was removed.
8 Mm Punch Excision Text: A 8 mm punch biopsy was used to excise the lesion to the level of the subcutaneous fat.  Blunt dissection was used to free the lesion from the surrounding tissues and the lesion was removed.
10 Mm Punch Excision Text: A 10 mm punch biopsy was used to excise the lesion to the level of the subcutaneous fat.  Blunt dissection was used to free the lesion from the surrounding tissues and the lesion was removed.
12 Mm Punch Excision Text: A 12 mm punch biopsy was used to excise the lesion to the level of the subcutaneous fat.  Blunt dissection was used to free the lesion from the surrounding tissues and the lesion was removed.
Consent was obtained from the patient. The risks and benefits to therapy were discussed in detail. Specifically, the risks of infection, scarring, bleeding, prolonged wound healing, incomplete removal, allergy to anesthesia, nerve injury and recurrence were addressed. Prior to the procedure, the treatment site was clearly identified and confirmed by the patient. All components of Universal Protocol/PAUSE Rule completed.
Post-Care Instructions: I reviewed with the patient in detail post-care instructions. Patient is to keep the biopsy site dry overnight, and then apply bacitracin twice daily until healed. Patient may apply hydrogen peroxide soaks to remove any crusting.
Notification Instructions: Patient will be notified of biopsy results. However, patient instructed to call the office if not contacted within 2 weeks.
Billing Type: Third-Party Bill

## 2022-02-08 ENCOUNTER — APPOINTMENT (RX ONLY)
Dept: URBAN - METROPOLITAN AREA CLINIC 22 | Facility: CLINIC | Age: 62
Setting detail: DERMATOLOGY
End: 2022-02-08

## 2022-02-08 DIAGNOSIS — Z48.02 ENCOUNTER FOR REMOVAL OF SUTURES: ICD-10-CM

## 2022-02-08 PROCEDURE — ? SUTURE REMOVAL (GLOBAL PERIOD)

## 2022-02-08 ASSESSMENT — LOCATION SIMPLE DESCRIPTION DERM: LOCATION SIMPLE: LEFT POSTERIOR UPPER ARM

## 2022-02-08 ASSESSMENT — LOCATION ZONE DERM: LOCATION ZONE: ARM

## 2022-02-08 ASSESSMENT — LOCATION DETAILED DESCRIPTION DERM: LOCATION DETAILED: LEFT PROXIMAL POSTERIOR UPPER ARM

## 2022-02-08 NOTE — PROCEDURE: SUTURE REMOVAL (GLOBAL PERIOD)
Detail Level: Detailed
Add 03899 Cpt? (Important Note: In 2017 The Use Of 89592 Is Being Tracked By Cms To Determine Future Global Period Reimbursement For Global Periods): no

## 2022-08-31 ENCOUNTER — APPOINTMENT (RX ONLY)
Dept: URBAN - METROPOLITAN AREA CLINIC 22 | Facility: CLINIC | Age: 62
Setting detail: DERMATOLOGY
End: 2022-08-31

## 2022-08-31 DIAGNOSIS — L663 OTHER SPECIFIED DISEASES OF HAIR AND HAIR FOLLICLES: ICD-10-CM | Status: RESOLVED

## 2022-08-31 DIAGNOSIS — Z87.2 PERSONAL HISTORY OF DISEASES OF THE SKIN AND SUBCUTANEOUS TISSUE: ICD-10-CM

## 2022-08-31 DIAGNOSIS — D22 MELANOCYTIC NEVI: ICD-10-CM

## 2022-08-31 DIAGNOSIS — L82.1 OTHER SEBORRHEIC KERATOSIS: ICD-10-CM

## 2022-08-31 DIAGNOSIS — Z85.820 PERSONAL HISTORY OF MALIGNANT MELANOMA OF SKIN: ICD-10-CM

## 2022-08-31 DIAGNOSIS — L73.9 FOLLICULAR DISORDER, UNSPECIFIED: ICD-10-CM | Status: RESOLVED

## 2022-08-31 DIAGNOSIS — L738 OTHER SPECIFIED DISEASES OF HAIR AND HAIR FOLLICLES: ICD-10-CM | Status: RESOLVED

## 2022-08-31 DIAGNOSIS — L81.4 OTHER MELANIN HYPERPIGMENTATION: ICD-10-CM

## 2022-08-31 PROBLEM — D22.5 MELANOCYTIC NEVI OF TRUNK: Status: ACTIVE | Noted: 2022-08-31

## 2022-08-31 PROBLEM — L02.821 FURUNCLE OF HEAD [ANY PART, EXCEPT FACE]: Status: ACTIVE | Noted: 2022-08-31

## 2022-08-31 PROCEDURE — ? SUNSCREEN TREATMENT REGIMEN

## 2022-08-31 PROCEDURE — ? TREATMENT REGIMEN

## 2022-08-31 PROCEDURE — ? COUNSELING

## 2022-08-31 PROCEDURE — 99213 OFFICE O/P EST LOW 20 MIN: CPT

## 2022-08-31 ASSESSMENT — LOCATION SIMPLE DESCRIPTION DERM
LOCATION SIMPLE: LEFT FOREARM
LOCATION SIMPLE: RIGHT ANTERIOR NECK
LOCATION SIMPLE: ANTERIOR SCALP
LOCATION SIMPLE: LEFT POSTERIOR UPPER ARM
LOCATION SIMPLE: RIGHT FOREARM
LOCATION SIMPLE: UPPER BACK
LOCATION SIMPLE: INFERIOR FOREHEAD

## 2022-08-31 ASSESSMENT — LOCATION ZONE DERM
LOCATION ZONE: ARM
LOCATION ZONE: TRUNK
LOCATION ZONE: NECK
LOCATION ZONE: SCALP
LOCATION ZONE: FACE

## 2022-08-31 ASSESSMENT — LOCATION DETAILED DESCRIPTION DERM
LOCATION DETAILED: LEFT DISTAL LATERAL POSTERIOR UPPER ARM
LOCATION DETAILED: SUPERIOR THORACIC SPINE
LOCATION DETAILED: RIGHT VENTRAL PROXIMAL FOREARM
LOCATION DETAILED: LEFT VENTRAL PROXIMAL FOREARM
LOCATION DETAILED: RIGHT INFERIOR ANTERIOR NECK
LOCATION DETAILED: MID-FRONTAL SCALP
LOCATION DETAILED: INFERIOR THORACIC SPINE
LOCATION DETAILED: INFERIOR MID FOREHEAD

## 2022-08-31 ASSESSMENT — SEVERITY ASSESSMENT: SEVERITY: CLEAR

## 2022-08-31 NOTE — HPI: SKIN LESION
Is This A New Presentation, Or A Follow-Up?: Skin Lesions
How Severe Is Your Skin Lesion?: moderate
Has Your Skin Lesion Been Treated?: not been treated
Additional History: Was seen by pcp 5/3/2022 and lesion were infected and she was prescribed Cephalexin.

## 2023-03-01 ENCOUNTER — APPOINTMENT (RX ONLY)
Dept: URBAN - METROPOLITAN AREA CLINIC 22 | Facility: CLINIC | Age: 63
Setting detail: DERMATOLOGY
End: 2023-03-01

## 2023-03-01 DIAGNOSIS — L738 OTHER SPECIFIED DISEASES OF HAIR AND HAIR FOLLICLES: ICD-10-CM | Status: INADEQUATELY CONTROLLED

## 2023-03-01 DIAGNOSIS — L82.1 OTHER SEBORRHEIC KERATOSIS: ICD-10-CM

## 2023-03-01 DIAGNOSIS — L73.9 FOLLICULAR DISORDER, UNSPECIFIED: ICD-10-CM | Status: INADEQUATELY CONTROLLED

## 2023-03-01 DIAGNOSIS — L81.4 OTHER MELANIN HYPERPIGMENTATION: ICD-10-CM

## 2023-03-01 DIAGNOSIS — Z85.820 PERSONAL HISTORY OF MALIGNANT MELANOMA OF SKIN: ICD-10-CM

## 2023-03-01 DIAGNOSIS — D22 MELANOCYTIC NEVI: ICD-10-CM

## 2023-03-01 DIAGNOSIS — Z87.2 PERSONAL HISTORY OF DISEASES OF THE SKIN AND SUBCUTANEOUS TISSUE: ICD-10-CM

## 2023-03-01 DIAGNOSIS — L663 OTHER SPECIFIED DISEASES OF HAIR AND HAIR FOLLICLES: ICD-10-CM | Status: INADEQUATELY CONTROLLED

## 2023-03-01 PROBLEM — D22.5 MELANOCYTIC NEVI OF TRUNK: Status: ACTIVE | Noted: 2023-03-01

## 2023-03-01 PROBLEM — L02.821 FURUNCLE OF HEAD [ANY PART, EXCEPT FACE]: Status: ACTIVE | Noted: 2023-03-01

## 2023-03-01 PROCEDURE — ? PRESCRIPTION

## 2023-03-01 PROCEDURE — ? TREATMENT REGIMEN

## 2023-03-01 PROCEDURE — ? COUNSELING

## 2023-03-01 PROCEDURE — 99213 OFFICE O/P EST LOW 20 MIN: CPT

## 2023-03-01 PROCEDURE — ? SUNSCREEN TREATMENT REGIMEN

## 2023-03-01 RX ORDER — CLINDAMYCIN PHOSPHATE 10 MG/ML
1 SOLUTION TOPICAL BID
Qty: 60 | Refills: 5 | Status: ERX | COMMUNITY
Start: 2023-03-01

## 2023-03-01 RX ADMIN — CLINDAMYCIN PHOSPHATE 1: 10 SOLUTION TOPICAL at 00:00

## 2023-03-01 ASSESSMENT — LOCATION SIMPLE DESCRIPTION DERM
LOCATION SIMPLE: RIGHT ANTERIOR NECK
LOCATION SIMPLE: INFERIOR FOREHEAD
LOCATION SIMPLE: RIGHT CLAVICULAR SKIN
LOCATION SIMPLE: RIGHT FOREARM
LOCATION SIMPLE: ANTERIOR SCALP
LOCATION SIMPLE: UPPER BACK
LOCATION SIMPLE: LEFT FOREARM
LOCATION SIMPLE: LEFT POSTERIOR UPPER ARM

## 2023-03-01 ASSESSMENT — LOCATION DETAILED DESCRIPTION DERM
LOCATION DETAILED: INFERIOR THORACIC SPINE
LOCATION DETAILED: LEFT VENTRAL PROXIMAL FOREARM
LOCATION DETAILED: MID-FRONTAL SCALP
LOCATION DETAILED: LEFT DISTAL LATERAL POSTERIOR UPPER ARM
LOCATION DETAILED: RIGHT INFERIOR ANTERIOR NECK
LOCATION DETAILED: SUPERIOR THORACIC SPINE
LOCATION DETAILED: INFERIOR MID FOREHEAD
LOCATION DETAILED: RIGHT CLAVICULAR SKIN
LOCATION DETAILED: RIGHT VENTRAL PROXIMAL FOREARM

## 2023-03-01 ASSESSMENT — LOCATION ZONE DERM
LOCATION ZONE: NECK
LOCATION ZONE: TRUNK
LOCATION ZONE: ARM
LOCATION ZONE: FACE
LOCATION ZONE: SCALP

## 2023-03-03 RX ORDER — CLINDAMYCIN PHOSPHATE 10 MG/ML
1 SOLUTION TOPICAL BID
Qty: 60 | Refills: 1 | Status: ERX

## 2023-09-12 ENCOUNTER — RX ONLY (OUTPATIENT)
Age: 63
Setting detail: RX ONLY
End: 2023-09-12

## 2023-09-12 ENCOUNTER — APPOINTMENT (RX ONLY)
Dept: URBAN - METROPOLITAN AREA CLINIC 22 | Facility: CLINIC | Age: 63
Setting detail: DERMATOLOGY
End: 2023-09-12

## 2023-09-12 DIAGNOSIS — L81.4 OTHER MELANIN HYPERPIGMENTATION: ICD-10-CM

## 2023-09-12 DIAGNOSIS — L82.1 OTHER SEBORRHEIC KERATOSIS: ICD-10-CM

## 2023-09-12 DIAGNOSIS — L73.9 FOLLICULAR DISORDER, UNSPECIFIED: ICD-10-CM

## 2023-09-12 DIAGNOSIS — L68.9 HYPERTRICHOSIS, UNSPECIFIED: ICD-10-CM

## 2023-09-12 DIAGNOSIS — Z85.820 PERSONAL HISTORY OF MALIGNANT MELANOMA OF SKIN: ICD-10-CM

## 2023-09-12 DIAGNOSIS — D22 MELANOCYTIC NEVI: ICD-10-CM

## 2023-09-12 DIAGNOSIS — L738 OTHER SPECIFIED DISEASES OF HAIR AND HAIR FOLLICLES: ICD-10-CM

## 2023-09-12 DIAGNOSIS — Z87.2 PERSONAL HISTORY OF DISEASES OF THE SKIN AND SUBCUTANEOUS TISSUE: ICD-10-CM

## 2023-09-12 DIAGNOSIS — L663 OTHER SPECIFIED DISEASES OF HAIR AND HAIR FOLLICLES: ICD-10-CM

## 2023-09-12 PROBLEM — D22.5 MELANOCYTIC NEVI OF TRUNK: Status: ACTIVE | Noted: 2023-09-12

## 2023-09-12 PROBLEM — L02.821 FURUNCLE OF HEAD [ANY PART, EXCEPT FACE]: Status: ACTIVE | Noted: 2023-09-12

## 2023-09-12 PROCEDURE — 99213 OFFICE O/P EST LOW 20 MIN: CPT

## 2023-09-12 PROCEDURE — ? TREATMENT REGIMEN

## 2023-09-12 PROCEDURE — ? COUNSELING

## 2023-09-12 PROCEDURE — ? PRESCRIPTION MEDICATION MANAGEMENT

## 2023-09-12 PROCEDURE — ? SUNSCREEN TREATMENT REGIMEN

## 2023-09-12 ASSESSMENT — LOCATION DETAILED DESCRIPTION DERM
LOCATION DETAILED: RIGHT INFERIOR MEDIAL MALAR CHEEK
LOCATION DETAILED: RIGHT VENTRAL PROXIMAL FOREARM
LOCATION DETAILED: LEFT VENTRAL PROXIMAL FOREARM
LOCATION DETAILED: LEFT DISTAL LATERAL POSTERIOR UPPER ARM
LOCATION DETAILED: SUPERIOR THORACIC SPINE
LOCATION DETAILED: MID-FRONTAL SCALP
LOCATION DETAILED: INFERIOR THORACIC SPINE
LOCATION DETAILED: INFERIOR MID FOREHEAD
LOCATION DETAILED: RIGHT INFERIOR ANTERIOR NECK

## 2023-09-12 ASSESSMENT — LOCATION SIMPLE DESCRIPTION DERM
LOCATION SIMPLE: RIGHT ANTERIOR NECK
LOCATION SIMPLE: LEFT POSTERIOR UPPER ARM
LOCATION SIMPLE: RIGHT CHEEK
LOCATION SIMPLE: RIGHT FOREARM
LOCATION SIMPLE: UPPER BACK
LOCATION SIMPLE: ANTERIOR SCALP
LOCATION SIMPLE: INFERIOR FOREHEAD
LOCATION SIMPLE: LEFT FOREARM

## 2023-09-12 ASSESSMENT — LOCATION ZONE DERM
LOCATION ZONE: SCALP
LOCATION ZONE: ARM
LOCATION ZONE: NECK
LOCATION ZONE: FACE
LOCATION ZONE: TRUNK

## 2023-09-12 NOTE — PROCEDURE: PRESCRIPTION MEDICATION MANAGEMENT
Detail Level: Simple
Render In Strict Bullet Format?: No
Initiate Treatment: Compounded topical from [facility] once a day

## 2023-09-12 NOTE — PROCEDURE: TREATMENT REGIMEN
Detail Level: Simple
Continue Regimen: Cln shampoo daily\\nClindamycin solution bid for 2 weeks with flares

## 2024-10-04 ENCOUNTER — APPOINTMENT (RX ONLY)
Dept: URBAN - METROPOLITAN AREA CLINIC 22 | Facility: CLINIC | Age: 64
Setting detail: DERMATOLOGY
End: 2024-10-04

## 2024-10-04 DIAGNOSIS — L82.1 OTHER SEBORRHEIC KERATOSIS: ICD-10-CM

## 2024-10-04 DIAGNOSIS — D22 MELANOCYTIC NEVI: ICD-10-CM

## 2024-10-04 DIAGNOSIS — Z85.820 PERSONAL HISTORY OF MALIGNANT MELANOMA OF SKIN: ICD-10-CM

## 2024-10-04 DIAGNOSIS — Z87.2 PERSONAL HISTORY OF DISEASES OF THE SKIN AND SUBCUTANEOUS TISSUE: ICD-10-CM

## 2024-10-04 DIAGNOSIS — L81.4 OTHER MELANIN HYPERPIGMENTATION: ICD-10-CM

## 2024-10-04 PROBLEM — D48.5 NEOPLASM OF UNCERTAIN BEHAVIOR OF SKIN: Status: ACTIVE | Noted: 2024-10-04

## 2024-10-04 PROBLEM — D22.5 MELANOCYTIC NEVI OF TRUNK: Status: ACTIVE | Noted: 2024-10-04

## 2024-10-04 PROCEDURE — 11103 TANGNTL BX SKIN EA SEP/ADDL: CPT

## 2024-10-04 PROCEDURE — ? BIOPSY BY SHAVE METHOD

## 2024-10-04 PROCEDURE — 11102 TANGNTL BX SKIN SINGLE LES: CPT

## 2024-10-04 PROCEDURE — ? SUNSCREEN TREATMENT REGIMEN

## 2024-10-04 PROCEDURE — 99213 OFFICE O/P EST LOW 20 MIN: CPT | Mod: 25

## 2024-10-04 PROCEDURE — ? COUNSELING

## 2024-10-04 ASSESSMENT — LOCATION ZONE DERM
LOCATION ZONE: TRUNK
LOCATION ZONE: NECK
LOCATION ZONE: ARM
LOCATION ZONE: FACE

## 2024-10-04 ASSESSMENT — LOCATION SIMPLE DESCRIPTION DERM
LOCATION SIMPLE: RIGHT CHEEK
LOCATION SIMPLE: UPPER BACK
LOCATION SIMPLE: RIGHT ANTERIOR NECK
LOCATION SIMPLE: INFERIOR FOREHEAD
LOCATION SIMPLE: LEFT POSTERIOR UPPER ARM
LOCATION SIMPLE: LEFT FOREARM
LOCATION SIMPLE: CHEST
LOCATION SIMPLE: RIGHT FOREARM

## 2024-10-04 ASSESSMENT — LOCATION DETAILED DESCRIPTION DERM
LOCATION DETAILED: MIDDLE STERNUM
LOCATION DETAILED: LEFT DISTAL LATERAL POSTERIOR UPPER ARM
LOCATION DETAILED: LEFT VENTRAL PROXIMAL FOREARM
LOCATION DETAILED: INFERIOR THORACIC SPINE
LOCATION DETAILED: RIGHT SUPERIOR MEDIAL MALAR CHEEK
LOCATION DETAILED: SUPERIOR THORACIC SPINE
LOCATION DETAILED: INFERIOR MID FOREHEAD
LOCATION DETAILED: RIGHT VENTRAL PROXIMAL FOREARM
LOCATION DETAILED: RIGHT INFERIOR ANTERIOR NECK

## 2024-10-04 NOTE — PROCEDURE: BIOPSY BY SHAVE METHOD
Detail Level: Detailed
Depth Of Biopsy: dermis
Was A Bandage Applied: Yes
Size Of Lesion In Cm: 0.2
X Size Of Lesion In Cm: 0
Biopsy Type: H and E
Biopsy Method: Personna blade
Anesthesia Type: 1% lidocaine without epinephrine
Anesthesia Volume In Cc: 1.5
Hemostasis: Aluminum Chloride
Wound Care: Petrolatum
Dressing: pressure dressing with telfa
Destruction After The Procedure: No
Type Of Destruction Used: Curettage
Curettage Text: The wound bed was treated with curettage after the biopsy was performed.
Cryotherapy Text: The wound bed was treated with cryotherapy after the biopsy was performed.
Electrodesiccation Text: The wound bed was treated with electrodesiccation after the biopsy was performed.
Electrodesiccation And Curettage Text: The wound bed was treated with electrodesiccation and curettage after the biopsy was performed.
Silver Nitrate Text: The wound bed was treated with silver nitrate after the biopsy was performed.
Lab: [id number]
Lab Facility: 
Consent: Written consent was obtained and risks were reviewed including but not limited to scarring, infection, bleeding, scabbing, incomplete removal, nerve damage and allergy to anesthesia.
Post-Care Instructions: I reviewed with the patient in detail post-care instructions. Patient is to keep the biopsy site dry overnight. Gentle cleansing daily.  Apply petroleum ointment daily until healed. Patient may apply hydrogen peroxide soaks to remove any crusting.
Notification Instructions: Patient will be notified of biopsy results. However, patient instructed to call the office if not contacted within 2 weeks.
Billing Type: Third-Party Bill
Information: Selecting Yes will display possible errors in your note based on the variables you have selected. This validation is only offered as a suggestion for you. PLEASE NOTE THAT THE VALIDATION TEXT WILL BE REMOVED WHEN YOU FINALIZE YOUR NOTE. IF YOU WANT TO FAX A PRELIMINARY NOTE YOU WILL NEED TO TOGGLE THIS TO 'NO' IF YOU DO NOT WANT IT IN YOUR FAXED NOTE.
Size Of Lesion In Cm: 1

## 2024-11-19 ENCOUNTER — APPOINTMENT (RX ONLY)
Dept: URBAN - METROPOLITAN AREA CLINIC 22 | Facility: CLINIC | Age: 64
Setting detail: DERMATOLOGY
End: 2024-11-19

## 2024-11-19 PROBLEM — D04.5 CARCINOMA IN SITU OF SKIN OF TRUNK: Status: ACTIVE | Noted: 2024-11-19

## 2024-11-19 PROCEDURE — 17262 DSTRJ MAL LES T/A/L 1.1-2.0: CPT

## 2024-11-19 PROCEDURE — ? CURETTAGE AND DESTRUCTION

## (undated) DEVICE — SENSOR SPO2 NEO LNCS ADHESIVE (20/BX) SEE USER NOTES

## (undated) DEVICE — ELECTRODE DUAL RETURN W/ CORD - (50/PK)

## (undated) DEVICE — GLOVE BIOGEL SZ 8 SURGICAL PF LTX - (50PR/BX 4BX/CA)

## (undated) DEVICE — MASK ANESTHESIA ADULT  - (100/CA)

## (undated) DEVICE — SUTURE GENERAL

## (undated) DEVICE — GOWN WARMING STANDARD FLEX - (30/CA)

## (undated) DEVICE — KIT CARPAL TUNNEL ENDOSCOPIC

## (undated) DEVICE — PACK LOWER EXTREMITY - (2/CA)

## (undated) DEVICE — BANDAGE STERILE 2 IN X 75 IN (12EA/BX 8BX/CA)

## (undated) DEVICE — SET EXTENSION WITH 2 PORTS (48EA/CA) ***PART #2C8610 IS A SUBSTITUTE*****

## (undated) DEVICE — SODIUM CHL IRRIGATION 0.9% 1000ML (12EA/CA)

## (undated) DEVICE — KIT ANESTHESIA W/CIRCUIT & 3/LT BAG W/FILTER (20EA/CA)

## (undated) DEVICE — NEPTUNE 4 PORT MANIFOLD - (20/PK)

## (undated) DEVICE — LACTATED RINGERS INJ 1000 ML - (14EA/CA 60CA/PF)

## (undated) DEVICE — SET LEADWIRE 5 LEAD BEDSIDE DISPOSABLE ECG (1SET OF 5/EA)

## (undated) DEVICE — CANISTER SUCTION 3000ML MECHANICAL FILTER AUTO SHUTOFF MEDI-VAC NONSTERILE LF DISP  (40EA/CA)

## (undated) DEVICE — TUBING CLEARLINK DUO-VENT - C-FLO (48EA/CA)

## (undated) DEVICE — ELECTRODE 850 FOAM ADHESIVE - HYDROGEL RADIOTRNSPRNT (50/PK)

## (undated) DEVICE — SUTURE 4-0 ETHILON FS-2 18 (36PK/BX)"

## (undated) DEVICE — CHLORAPREP 26 ML APPLICATOR - ORANGE TINT(25/CA)

## (undated) DEVICE — SUCTION INSTRUMENT YANKAUER BULBOUS TIP W/O VENT (50EA/CA)

## (undated) DEVICE — APPLICATOR COTTON TIP 6 IN - STERILE (2EA/PK 100PK/BX)

## (undated) DEVICE — HEAD HOLDER JUNIOR/ADULT

## (undated) DEVICE — DRAPE STRLE REG TOWEL 18X24 - (10/BX 4BX/CA)"

## (undated) DEVICE — AGEE CARPAL TUNNEL RELEASE (6EA/PK)

## (undated) DEVICE — ANTI-FOG SOLUTION - 60BTL/CA

## (undated) DEVICE — GLOVE BIOGEL ECLIPSE PF LATEX SIZE 7.5

## (undated) DEVICE — PROTECTOR ULNA NERVE - (36PR/CA)